# Patient Record
Sex: FEMALE | Race: OTHER | HISPANIC OR LATINO | URBAN - METROPOLITAN AREA
[De-identification: names, ages, dates, MRNs, and addresses within clinical notes are randomized per-mention and may not be internally consistent; named-entity substitution may affect disease eponyms.]

---

## 2017-11-30 ENCOUNTER — EMERGENCY (EMERGENCY)
Facility: HOSPITAL | Age: 37
LOS: 1 days | Discharge: HOME | End: 2017-11-30
Attending: EMERGENCY MEDICINE | Admitting: EMERGENCY MEDICINE
Payer: COMMERCIAL

## 2017-11-30 VITALS
HEART RATE: 110 BPM | SYSTOLIC BLOOD PRESSURE: 140 MMHG | OXYGEN SATURATION: 97 % | DIASTOLIC BLOOD PRESSURE: 80 MMHG | RESPIRATION RATE: 20 BRPM

## 2017-11-30 VITALS
HEART RATE: 99 BPM | SYSTOLIC BLOOD PRESSURE: 126 MMHG | TEMPERATURE: 98 F | RESPIRATION RATE: 18 BRPM | OXYGEN SATURATION: 99 % | DIASTOLIC BLOOD PRESSURE: 85 MMHG

## 2017-11-30 PROCEDURE — 99284 EMERGENCY DEPT VISIT MOD MDM: CPT

## 2017-11-30 PROCEDURE — 72125 CT NECK SPINE W/O DYE: CPT | Mod: 26

## 2017-11-30 PROCEDURE — 99284 EMERGENCY DEPT VISIT MOD MDM: CPT | Mod: 25

## 2017-11-30 PROCEDURE — 72125 CT NECK SPINE W/O DYE: CPT

## 2017-11-30 PROCEDURE — 73060 X-RAY EXAM OF HUMERUS: CPT

## 2017-11-30 PROCEDURE — 73060 X-RAY EXAM OF HUMERUS: CPT | Mod: 26,LT
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
## 2017-11-30 NOTE — ED PROVIDER NOTE - PROGRESS NOTE DETAILS
CT negative. No c-spine tenderness currently good ROM. C-collar cleared. Patient reports improvement in symptoms. Agrees with discharge and outpatient follow up with strict return precautions..
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
## 2021-02-19 ENCOUNTER — ASOB RESULT (OUTPATIENT)
Age: 41
End: 2021-02-19

## 2021-02-19 ENCOUNTER — TRANSCRIPTION ENCOUNTER (OUTPATIENT)
Age: 41
End: 2021-02-19

## 2021-02-19 ENCOUNTER — APPOINTMENT (OUTPATIENT)
Dept: ANTEPARTUM | Facility: CLINIC | Age: 41
End: 2021-02-19
Payer: COMMERCIAL

## 2021-02-19 PROCEDURE — 76801 OB US < 14 WKS SINGLE FETUS: CPT

## 2021-02-19 PROCEDURE — 76813 OB US NUCHAL MEAS 1 GEST: CPT

## 2021-02-19 PROCEDURE — 36416 COLLJ CAPILLARY BLOOD SPEC: CPT

## 2021-02-19 PROCEDURE — 99072 ADDL SUPL MATRL&STAF TM PHE: CPT

## 2021-04-10 ENCOUNTER — ASOB RESULT (OUTPATIENT)
Age: 41
End: 2021-04-10

## 2021-04-10 ENCOUNTER — APPOINTMENT (OUTPATIENT)
Dept: ANTEPARTUM | Facility: CLINIC | Age: 41
End: 2021-04-10
Payer: COMMERCIAL

## 2021-04-10 PROCEDURE — 99072 ADDL SUPL MATRL&STAF TM PHE: CPT

## 2021-04-10 PROCEDURE — 76811 OB US DETAILED SNGL FETUS: CPT

## 2021-04-20 ENCOUNTER — APPOINTMENT (OUTPATIENT)
Dept: ANTEPARTUM | Facility: CLINIC | Age: 41
End: 2021-04-20
Payer: COMMERCIAL

## 2021-04-20 ENCOUNTER — ASOB RESULT (OUTPATIENT)
Age: 41
End: 2021-04-20

## 2021-04-20 PROCEDURE — 99072 ADDL SUPL MATRL&STAF TM PHE: CPT

## 2021-04-20 PROCEDURE — 99213 OFFICE O/P EST LOW 20 MIN: CPT | Mod: 25

## 2021-04-20 PROCEDURE — 76816 OB US FOLLOW-UP PER FETUS: CPT

## 2021-07-01 ENCOUNTER — INPATIENT (INPATIENT)
Facility: HOSPITAL | Age: 41
LOS: 14 days | Discharge: HOME | End: 2021-07-16
Attending: OBSTETRICS & GYNECOLOGY | Admitting: OBSTETRICS & GYNECOLOGY
Payer: COMMERCIAL

## 2021-07-01 VITALS
DIASTOLIC BLOOD PRESSURE: 69 MMHG | RESPIRATION RATE: 16 BRPM | SYSTOLIC BLOOD PRESSURE: 126 MMHG | TEMPERATURE: 99 F | HEART RATE: 106 BPM

## 2021-07-01 DIAGNOSIS — Z3A.00 WEEKS OF GESTATION OF PREGNANCY NOT SPECIFIED: ICD-10-CM

## 2021-07-01 DIAGNOSIS — O42.90 PREMATURE RUPTURE OF MEMBRANES, UNSPECIFIED AS TO LENGTH OF TIME BETWEEN RUPTURE AND ONSET OF LABOR, UNSPECIFIED WEEKS OF GESTATION: ICD-10-CM

## 2021-07-01 DIAGNOSIS — Z98.891 HISTORY OF UTERINE SCAR FROM PREVIOUS SURGERY: Chronic | ICD-10-CM

## 2021-07-01 DIAGNOSIS — O26.899 OTHER SPECIFIED PREGNANCY RELATED CONDITIONS, UNSPECIFIED TRIMESTER: ICD-10-CM

## 2021-07-01 LAB
APPEARANCE UR: CLEAR — SIGNIFICANT CHANGE UP
BASOPHILS # BLD AUTO: 0.04 K/UL — SIGNIFICANT CHANGE UP (ref 0–0.2)
BASOPHILS NFR BLD AUTO: 0.3 % — SIGNIFICANT CHANGE UP (ref 0–2)
BILIRUB UR-MCNC: NEGATIVE — SIGNIFICANT CHANGE UP
BLD GP AB SCN SERPL QL: NEGATIVE — SIGNIFICANT CHANGE UP
COLOR SPEC: SIGNIFICANT CHANGE UP
DIFF PNL FLD: NEGATIVE — SIGNIFICANT CHANGE UP
EOSINOPHIL # BLD AUTO: 0.16 K/UL — SIGNIFICANT CHANGE UP (ref 0–0.5)
EOSINOPHIL NFR BLD AUTO: 1.2 % — SIGNIFICANT CHANGE UP (ref 0–6)
GLUCOSE UR QL: NEGATIVE — SIGNIFICANT CHANGE UP
HCT VFR BLD CALC: 37.9 % — SIGNIFICANT CHANGE UP (ref 34.5–45)
HGB BLD-MCNC: 11.9 G/DL — SIGNIFICANT CHANGE UP (ref 11.5–15.5)
IANC: 9.74 K/UL — HIGH (ref 1.5–8.5)
IMM GRANULOCYTES NFR BLD AUTO: 1.4 % — SIGNIFICANT CHANGE UP (ref 0–1.5)
KETONES UR-MCNC: NEGATIVE — SIGNIFICANT CHANGE UP
LEUKOCYTE ESTERASE UR-ACNC: NEGATIVE — SIGNIFICANT CHANGE UP
LYMPHOCYTES # BLD AUTO: 18 % — SIGNIFICANT CHANGE UP (ref 13–44)
LYMPHOCYTES # BLD AUTO: 2.43 K/UL — SIGNIFICANT CHANGE UP (ref 1–3.3)
MCHC RBC-ENTMCNC: 28.9 PG — SIGNIFICANT CHANGE UP (ref 27–34)
MCHC RBC-ENTMCNC: 31.4 GM/DL — LOW (ref 32–36)
MCV RBC AUTO: 92 FL — SIGNIFICANT CHANGE UP (ref 80–100)
MONOCYTES # BLD AUTO: 0.97 K/UL — HIGH (ref 0–0.9)
MONOCYTES NFR BLD AUTO: 7.2 % — SIGNIFICANT CHANGE UP (ref 2–14)
NEUTROPHILS # BLD AUTO: 9.74 K/UL — HIGH (ref 1.8–7.4)
NEUTROPHILS NFR BLD AUTO: 71.9 % — SIGNIFICANT CHANGE UP (ref 43–77)
NITRITE UR-MCNC: NEGATIVE — SIGNIFICANT CHANGE UP
NRBC # BLD: 0 /100 WBCS — SIGNIFICANT CHANGE UP
NRBC # FLD: 0 K/UL — SIGNIFICANT CHANGE UP
PH UR: 7 — SIGNIFICANT CHANGE UP (ref 5–8)
PLATELET # BLD AUTO: 243 K/UL — SIGNIFICANT CHANGE UP (ref 150–400)
PROT UR-MCNC: NEGATIVE — SIGNIFICANT CHANGE UP
RBC # BLD: 4.12 M/UL — SIGNIFICANT CHANGE UP (ref 3.8–5.2)
RBC # FLD: 13.9 % — SIGNIFICANT CHANGE UP (ref 10.3–14.5)
RH IG SCN BLD-IMP: POSITIVE — SIGNIFICANT CHANGE UP
RH IG SCN BLD-IMP: POSITIVE — SIGNIFICANT CHANGE UP
SP GR SPEC: 1.01 — SIGNIFICANT CHANGE UP (ref 1.01–1.02)
UROBILINOGEN FLD QL: SIGNIFICANT CHANGE UP
WBC # BLD: 13.53 K/UL — HIGH (ref 3.8–10.5)
WBC # FLD AUTO: 13.53 K/UL — HIGH (ref 3.8–10.5)

## 2021-07-01 RX ORDER — AMPICILLIN TRIHYDRATE 250 MG
2 CAPSULE ORAL EVERY 6 HOURS
Refills: 0 | Status: DISCONTINUED | OUTPATIENT
Start: 2021-07-02 | End: 2021-07-02

## 2021-07-01 RX ORDER — AMOXICILLIN 250 MG/5ML
500 SUSPENSION, RECONSTITUTED, ORAL (ML) ORAL EVERY 8 HOURS
Refills: 0 | Status: DISCONTINUED | OUTPATIENT
Start: 2021-07-01 | End: 2021-07-02

## 2021-07-01 RX ORDER — SODIUM CHLORIDE 9 MG/ML
1000 INJECTION, SOLUTION INTRAVENOUS
Refills: 0 | Status: DISCONTINUED | OUTPATIENT
Start: 2021-07-01 | End: 2021-07-01

## 2021-07-01 RX ORDER — AMPICILLIN TRIHYDRATE 250 MG
2 CAPSULE ORAL ONCE
Refills: 0 | Status: COMPLETED | OUTPATIENT
Start: 2021-07-01 | End: 2021-07-01

## 2021-07-01 RX ORDER — AMPICILLIN TRIHYDRATE 250 MG
CAPSULE ORAL
Refills: 0 | Status: DISCONTINUED | OUTPATIENT
Start: 2021-07-01 | End: 2021-07-02

## 2021-07-01 RX ORDER — AZITHROMYCIN 500 MG/1
1000 TABLET, FILM COATED ORAL ONCE
Refills: 0 | Status: COMPLETED | OUTPATIENT
Start: 2021-07-01 | End: 2021-07-01

## 2021-07-01 RX ADMIN — Medication 12 MILLIGRAM(S): at 19:31

## 2021-07-01 RX ADMIN — AZITHROMYCIN 1000 MILLIGRAM(S): 500 TABLET, FILM COATED ORAL at 19:31

## 2021-07-01 RX ADMIN — Medication 116 GRAM(S): at 19:09

## 2021-07-01 NOTE — OB PROVIDER H&P - ATTENDING COMMENTS
Admit for PPROM  Patient is for latency ABx and BMZ  Plan for delivery at 34 wks   Patient has history of  section and is for repeat  section and bilateral tubal ligation

## 2021-07-01 NOTE — OB PROVIDER H&P - TERM DELIVERIES, OB PROFILE
MA and MDI TX given.  Pt. Tolerated tx well, MARGARET.  Pt. States that she would like to be placed on BIPAP tonight between  9:30pm and 10:00pm.     1

## 2021-07-01 NOTE — OB RN PATIENT PROFILE - NS_DATEOFLASTVISIT_OBGYN_ALL_OB_DT
Pt active and playing in triage waiting room. No outward s/sx of discomfort or distress noted.   02-Jun-2021

## 2021-07-01 NOTE — OB PROVIDER TRIAGE NOTE - HISTORY OF PRESENT ILLNESS
40yo  female  @ 32.2 wks SLIUP uncomp PNC here complaining of leaking clear fluid since 10pm last night. Pt reports GFM and denies VB/ctx's.    Pmhx-denies  Pshx/ltrr-u-ffuajsl  Meds-PNV  NKDA  Past [id number] for FTD  Gyn-uterine polyp  Soc-denies

## 2021-07-01 NOTE — OB PROVIDER H&P - ASSESSMENT
40yo  female  @ 32.2 wks SLIUP uncomp PNC here with confirmed rupture of clear membranes since 10 pm last night  -SSE os appears 1 cm; no ctx's on toco and pt denies feeling any ctx's  -GBS collected and sent with UA and Ucx  -pt was admitted for PPROM and was dc Dr Wolff  -pt for Abx's and betamethasone  -pt was swabbed for covid-9  -support person presents covid vaccination

## 2021-07-01 NOTE — OB PROVIDER TRIAGE NOTE - NSHPPHYSICALEXAM_GEN_ALL_CORE
Gen: A&O x 3; NAD  Vitals; BP-133/74; P-102; T-37.2    Pulm-CTA B/L; no wheezes  Cor-clear S1S2; no murmurs  Abd exam-soft and nontender    SSE-+pooling of clear fluid; +nitrazine; +ferning. Os appears to be FT dilated    TAS-psoterior placenta; vtx; 4#14 (2211g); 8/8 BPP    NST cat I with 150 basline with accels and mod variability; no ctx's

## 2021-07-01 NOTE — OB PROVIDER H&P - HISTORY OF PRESENT ILLNESS
42yo  female  @ 32.2 wks SLIUP uncomp PNC here complaining of leaking clear fluid since 10pm last night. Pt reports GFM and denies VB/ctx's.    Pmhx-denies  Pshx/klpe-t-rehsuxw  Meds-PNV  NKDA  Past ob-[id number] for FTD  Gyn-uterine polyp  Soc-denies 42yo  female  @ 32.2 wks SLIUP uncomp PNC here complaining of leaking clear fluid since 10pm last night. Pt reports GFM and denies VB/ctx's.    Pmhx-denies  Pshx/hyls-d-mqzfwfv  Meds-PNV  NKDA  Past ob-[id number] for FTD  Gyn-uterine polyp  Soc-denies

## 2021-07-01 NOTE — OB RN PATIENT PROFILE - CURRENT PREGNANCY COMPLICATIONS, OB PROFILE
None  Premature Rupture of Membranes (PPROM)/Gestational Age less than 36 Weeks/Maternal Unknown GBS

## 2021-07-02 DIAGNOSIS — O42.10 PREMATURE RUPTURE OF MEMBRANES, ONSET OF LABOR MORE THAN 24 HOURS FOLLOWING RUPTURE, UNSPECIFIED WEEKS OF GESTATION: ICD-10-CM

## 2021-07-02 LAB
BASOPHILS # BLD AUTO: 0.03 K/UL — SIGNIFICANT CHANGE UP (ref 0–0.2)
BASOPHILS # BLD AUTO: 0.03 K/UL — SIGNIFICANT CHANGE UP (ref 0–0.2)
BASOPHILS NFR BLD AUTO: 0.2 % — SIGNIFICANT CHANGE UP (ref 0–2)
BASOPHILS NFR BLD AUTO: 0.2 % — SIGNIFICANT CHANGE UP (ref 0–2)
COVID-19 SPIKE DOMAIN AB INTERP: NEGATIVE — SIGNIFICANT CHANGE UP
COVID-19 SPIKE DOMAIN ANTIBODY RESULT: 0.4 U/ML — SIGNIFICANT CHANGE UP
EOSINOPHIL # BLD AUTO: 0 K/UL — SIGNIFICANT CHANGE UP (ref 0–0.5)
EOSINOPHIL # BLD AUTO: 0.02 K/UL — SIGNIFICANT CHANGE UP (ref 0–0.5)
EOSINOPHIL NFR BLD AUTO: 0 % — SIGNIFICANT CHANGE UP (ref 0–6)
EOSINOPHIL NFR BLD AUTO: 0.1 % — SIGNIFICANT CHANGE UP (ref 0–6)
HCT VFR BLD CALC: 33.4 % — LOW (ref 34.5–45)
HCT VFR BLD CALC: 35 % — SIGNIFICANT CHANGE UP (ref 34.5–45)
HGB BLD-MCNC: 10.8 G/DL — LOW (ref 11.5–15.5)
HGB BLD-MCNC: 11.4 G/DL — LOW (ref 11.5–15.5)
IANC: 13.68 K/UL — HIGH (ref 1.5–8.5)
IANC: 15.21 K/UL — HIGH (ref 1.5–8.5)
IMM GRANULOCYTES NFR BLD AUTO: 1.3 % — SIGNIFICANT CHANGE UP (ref 0–1.5)
IMM GRANULOCYTES NFR BLD AUTO: 1.8 % — HIGH (ref 0–1.5)
LYMPHOCYTES # BLD AUTO: 1.56 K/UL — SIGNIFICANT CHANGE UP (ref 1–3.3)
LYMPHOCYTES # BLD AUTO: 11.7 % — LOW (ref 13–44)
LYMPHOCYTES # BLD AUTO: 2.03 K/UL — SIGNIFICANT CHANGE UP (ref 1–3.3)
LYMPHOCYTES # BLD AUTO: 8.9 % — LOW (ref 13–44)
MCHC RBC-ENTMCNC: 28.9 PG — SIGNIFICANT CHANGE UP (ref 27–34)
MCHC RBC-ENTMCNC: 29.4 PG — SIGNIFICANT CHANGE UP (ref 27–34)
MCHC RBC-ENTMCNC: 32.3 GM/DL — SIGNIFICANT CHANGE UP (ref 32–36)
MCHC RBC-ENTMCNC: 32.6 GM/DL — SIGNIFICANT CHANGE UP (ref 32–36)
MCV RBC AUTO: 88.6 FL — SIGNIFICANT CHANGE UP (ref 80–100)
MCV RBC AUTO: 91 FL — SIGNIFICANT CHANGE UP (ref 80–100)
MONOCYTES # BLD AUTO: 0.38 K/UL — SIGNIFICANT CHANGE UP (ref 0–0.9)
MONOCYTES # BLD AUTO: 1.34 K/UL — HIGH (ref 0–0.9)
MONOCYTES NFR BLD AUTO: 2.2 % — SIGNIFICANT CHANGE UP (ref 2–14)
MONOCYTES NFR BLD AUTO: 7.7 % — SIGNIFICANT CHANGE UP (ref 2–14)
NEUTROPHILS # BLD AUTO: 13.68 K/UL — HIGH (ref 1.8–7.4)
NEUTROPHILS # BLD AUTO: 15.21 K/UL — HIGH (ref 1.8–7.4)
NEUTROPHILS NFR BLD AUTO: 79 % — HIGH (ref 43–77)
NEUTROPHILS NFR BLD AUTO: 86.9 % — HIGH (ref 43–77)
NRBC # BLD: 0 /100 WBCS — SIGNIFICANT CHANGE UP
NRBC # BLD: 0 /100 WBCS — SIGNIFICANT CHANGE UP
NRBC # FLD: 0 K/UL — SIGNIFICANT CHANGE UP
NRBC # FLD: 0 K/UL — SIGNIFICANT CHANGE UP
PLATELET # BLD AUTO: 233 K/UL — SIGNIFICANT CHANGE UP (ref 150–400)
PLATELET # BLD AUTO: 236 K/UL — SIGNIFICANT CHANGE UP (ref 150–400)
RBC # BLD: 3.67 M/UL — LOW (ref 3.8–5.2)
RBC # BLD: 3.95 M/UL — SIGNIFICANT CHANGE UP (ref 3.8–5.2)
RBC # FLD: 13.8 % — SIGNIFICANT CHANGE UP (ref 10.3–14.5)
RBC # FLD: 14 % — SIGNIFICANT CHANGE UP (ref 10.3–14.5)
SARS-COV-2 IGG+IGM SERPL QL IA: 0.4 U/ML — SIGNIFICANT CHANGE UP
SARS-COV-2 IGG+IGM SERPL QL IA: NEGATIVE — SIGNIFICANT CHANGE UP
SARS-COV-2 RNA SPEC QL NAA+PROBE: SIGNIFICANT CHANGE UP
T PALLIDUM AB TITR SER: NEGATIVE — SIGNIFICANT CHANGE UP
WBC # BLD: 17.33 K/UL — HIGH (ref 3.8–10.5)
WBC # BLD: 17.5 K/UL — HIGH (ref 3.8–10.5)
WBC # FLD AUTO: 17.33 K/UL — HIGH (ref 3.8–10.5)
WBC # FLD AUTO: 17.5 K/UL — HIGH (ref 3.8–10.5)

## 2021-07-02 PROCEDURE — 93010 ELECTROCARDIOGRAM REPORT: CPT

## 2021-07-02 RX ORDER — AMPICILLIN TRIHYDRATE 250 MG
2 CAPSULE ORAL EVERY 6 HOURS
Refills: 0 | Status: DISCONTINUED | OUTPATIENT
Start: 2021-07-02 | End: 2021-07-02

## 2021-07-02 RX ORDER — AMOXICILLIN 250 MG/5ML
500 SUSPENSION, RECONSTITUTED, ORAL (ML) ORAL EVERY 8 HOURS
Refills: 0 | Status: DISCONTINUED | OUTPATIENT
Start: 2021-07-02 | End: 2021-07-02

## 2021-07-02 RX ORDER — SODIUM CHLORIDE 9 MG/ML
1000 INJECTION, SOLUTION INTRAVENOUS
Refills: 0 | Status: DISCONTINUED | OUTPATIENT
Start: 2021-07-02 | End: 2021-07-02

## 2021-07-02 RX ORDER — SODIUM CHLORIDE 9 MG/ML
500 INJECTION, SOLUTION INTRAVENOUS ONCE
Refills: 0 | Status: COMPLETED | OUTPATIENT
Start: 2021-07-02 | End: 2021-07-02

## 2021-07-02 RX ORDER — AMPICILLIN TRIHYDRATE 250 MG
2 CAPSULE ORAL EVERY 6 HOURS
Refills: 0 | Status: DISCONTINUED | OUTPATIENT
Start: 2021-07-02 | End: 2021-07-03

## 2021-07-02 RX ORDER — AMOXICILLIN 250 MG/5ML
500 SUSPENSION, RECONSTITUTED, ORAL (ML) ORAL EVERY 8 HOURS
Refills: 0 | Status: COMPLETED | OUTPATIENT
Start: 2021-07-02

## 2021-07-02 RX ADMIN — Medication 116 GRAM(S): at 13:53

## 2021-07-02 RX ADMIN — Medication 116 GRAM(S): at 01:32

## 2021-07-02 RX ADMIN — Medication 116 GRAM(S): at 20:15

## 2021-07-02 RX ADMIN — SODIUM CHLORIDE 1000 MILLILITER(S): 9 INJECTION, SOLUTION INTRAVENOUS at 11:57

## 2021-07-02 RX ADMIN — Medication 116 GRAM(S): at 08:27

## 2021-07-02 RX ADMIN — Medication 12 MILLIGRAM(S): at 18:00

## 2021-07-02 NOTE — PROGRESS NOTE ADULT - ASSESSMENT
AP 41y yo  at 32w3d admitted with PPROM at 32w2d.  Patient is currently stable without signs of labor or infection.    1. PPROM  -c/w latency antibiotics: s/p Azithro (), Amp (-), for amox following 48h amp  -betamethasone for fetal lung maturity, #2 @ 730p  -continue to monitor fetal status and for signs of labor or infection    2.  Maternal well-being  -Reg diet  -HSQ, SCDs, and ambulation for DVT prophylaxis  -NS@125    3.  Fetal well being   -NST BID 2hr  -ATU sono twice weekly  -prenatal vitamin    4.  Delivery  -delivery at 34 w for rLTCS or earlier if fetal or maternal status deteriorates    ADomney PGY-3

## 2021-07-02 NOTE — PROVIDER CONTACT NOTE (OTHER) - ASSESSMENT
, with repeat of 120-122.  B/P 116/60, O2 sat 99%. resp 17.  denies any abd tenderness or cramping.  feeling good fetal movement.  FH

## 2021-07-02 NOTE — PROGRESS NOTE ADULT - SUBJECTIVE AND OBJECTIVE BOX
R3 Antepartum Note, HD#2    Patient seen and examined at bedside, no acute overnight events. No acute complaints. Pt reports +FM, denies LOF, VB, ctx, HA, epigastric pain, blurred vision, CP, SOB, N/V, fevers, and chills.    Vital Signs Last 24 Hours  T(C): 37.1 (07-02-21 @ 09:35), Max: 37.2 (07-01-21 @ 16:52)  HR: 122 (07-02-21 @ 09:35) (90 - 125)  BP: 116/60 (07-02-21 @ 09:35) (93/57 - 133/74)  RR: 17 (07-02-21 @ 09:35) (16 - 17)  SpO2: 99% (07-02-21 @ 09:35) (92% - 100%)      Physical Exam:  General: NAD  Abdomen: Soft, non-tender, gravid  Ext: No pain or swelling    NST reactive overnight        Labs:             11.4   17.50 )-----------( 236      ( 07-02 @ 10:26 )             35.0             amoxicillin 500 milliGRAM(s) Oral every 8 hours  ampicillin  IVPB 2 Gram(s) IV Intermittent every 6 hours  betamethasone Injectable 12 milliGRAM(s) IntraMuscular daily  heparin   Injectable 5000 Unit(s) SubCutaneous every 12 hours  lactated ringers Bolus 500 milliLiter(s) IV Bolus once  oxytocin Infusion 333.333 milliUNIT(s)/Min (1000 mL/Hr) IV Continuous <Continuous>    MEDICATIONS  (PRN):

## 2021-07-03 LAB
CULTURE RESULTS: NO GROWTH — SIGNIFICANT CHANGE UP
SPECIMEN SOURCE: SIGNIFICANT CHANGE UP

## 2021-07-03 RX ORDER — AMPICILLIN TRIHYDRATE 250 MG
2 CAPSULE ORAL EVERY 6 HOURS
Refills: 0 | Status: COMPLETED | OUTPATIENT
Start: 2021-07-03 | End: 2021-07-03

## 2021-07-03 RX ORDER — AMOXICILLIN 250 MG/5ML
500 SUSPENSION, RECONSTITUTED, ORAL (ML) ORAL EVERY 8 HOURS
Refills: 0 | Status: COMPLETED | OUTPATIENT
Start: 2021-07-03 | End: 2021-07-08

## 2021-07-03 RX ADMIN — Medication 116 GRAM(S): at 02:55

## 2021-07-03 RX ADMIN — Medication 216 GRAM(S): at 08:09

## 2021-07-03 RX ADMIN — Medication 216 GRAM(S): at 14:05

## 2021-07-03 RX ADMIN — Medication 500 MILLIGRAM(S): at 22:15

## 2021-07-03 NOTE — PROGRESS NOTE ADULT - ASSESSMENT
40yo  at 32w4d a/w PPROM at 32w2d. Patient is currently stable without signs of labor or infection.    1. PPROM  -c/w latency antibiotics: s/p Azithro (), Amp 6/8 doses completed (-), for amox following 48h amp (8 doses)  -s/p BMZ for fetal lung maturity (-2)  -continue to monitor fetal status and for signs of labor or infection    2.  Maternal well-being  -Reg diet  -HSQ, SCDs, and ambulation for DVT prophylaxis  -SLIV    3.  Fetal well being   -NST BID 2hr  -ATU sono twice weekly  -prenatal vitamin    4.  Delivery  -delivery at 34 w for rLTCS or earlier if fetal or maternal status deteriorates    Joanna Kathy R3   42yo  at 32w4d a/w PPROM at 32w2d. Patient is currently stable without signs of labor or infection.    1. PPROM  -c/w latency antibiotics: s/p Azithro (), Amp 6/8 doses completed (-), for amox following 48h amp (8 doses)  -s/p BMZ for fetal lung maturity (-2)  -continue to monitor fetal status and for signs of labor or infection    2.  Maternal well-being  -Reg diet  -HSQ, SCDs, and ambulation for DVT prophylaxis  -SLIV    3.  Fetal well being   -NST BID 2hr, start now 2/2 "slower" FM  -ATU sono twice weekly  -prenatal vitamin    4.  Delivery  -delivery at 34 w for rLTCS or earlier if fetal or maternal status deteriorates    Joanna Kathy R3

## 2021-07-03 NOTE — PROGRESS NOTE ADULT - SUBJECTIVE AND OBJECTIVE BOX
INTERVAL HPI/OVERNIGHT EVENTS: Pt seenat bedside.  Doing well, denies vaginal bleeding, contractions, foul smelling discharge, or decreased fetal movement     MEDICATIONS  (STANDING):  amoxicillin 500 milliGRAM(s) Oral every 8 hours  ampicillin  IVPB 2 Gram(s) IV Intermittent every 6 hours  heparin   Injectable 5000 Unit(s) SubCutaneous every 12 hours  oxytocin Infusion 333.333 milliUNIT(s)/Min (1000 mL/Hr) IV Continuous <Continuous>    MEDICATIONS  (PRN):      Vital Signs Last 24 Hrs  T(C): 36.7 (2021 05:24), Max: 37.2 (2021 22:28)  T(F): 98 (2021 05:24), Max: 98.9 (2021 22:28)  HR: 94 (2021 05:25) (88 - 122)  BP: 86/49 (2021 05:25) (86/49 - 116/60)  BP(mean): --  RR: 17 (2021 05:24) (16 - 17)  SpO2: 96% (2021 05:24) (96% - 100%)    PHYSICAL EXAM:    GA: NAD, A+0 x 3  Abd: ( + ) BS, soft, nontender, nondistended, no rebound or guarding,   Uterus: Fundus midline; firm    LABS:                        10.8   17.33 )-----------( 233      ( 2021 17:37 )             33.4             Urinalysis Basic - ( 2021 20:37 )    Color: Light Yellow / Appearance: Clear / S.014 / pH: x  Gluc: x / Ketone: Negative  / Bili: Negative / Urobili: <2 mg/dL   Blood: x / Protein: Negative / Nitrite: Negative   Leuk Esterase: Negative / RBC: x / WBC x   Sq Epi: x / Non Sq Epi: x / Bacteria: x          RADIOLOGY & ADDITIONAL TESTS:

## 2021-07-03 NOTE — PROGRESS NOTE ADULT - ASSESSMENT
P1 @ 33 4/7 admitted for pprom, elevated white count but no other signs of infection   1. PPROM  -c/w latency antibiotics: s/p Azithro (7/1), Amp (7/1-), for amox following 48h amp  -betamethasone for fetal lung maturity, #2 @ 730p  -continue to monitor fetal status and for signs of labor or infection    2.  Maternal well-being  -Reg diet  -HSQ, SCDs, and ambulation for DVT prophylaxis  -NS@125    3.  Fetal well being   -NST BID 2hr  -ATU sono twice weekly  -prenatal vitamin    4.  Delivery  -delivery at 34 w for rLTCS or earlier if fetal or maternal status deteriorates

## 2021-07-03 NOTE — PROGRESS NOTE ADULT - SUBJECTIVE AND OBJECTIVE BOX
R3 OB ANTEPARTUM NOTE    Patient seen and examined at bedside, no acute overnight events.   No acute complaints. Pt reports slower fetal movement than typical at this hour.  Continued LOF, changes pads 3x/day 2/2 hygiene and saturation.   Denies CTX, VB. Denies CP, SOB, N/V, fevers, and chills.    Vital Signs Last 24 Hours  T(C): 36.7 (07-03-21 @ 05:24), Max: 37.2 (07-02-21 @ 22:28)  HR: 94 (07-03-21 @ 05:25) (88 - 122)  BP: 86/49 (07-03-21 @ 05:25) (86/49 - 116/60)  RR: 17 (07-03-21 @ 05:24) (16 - 17)  SpO2: 96% (07-03-21 @ 05:24) (96% - 99%)      Physical Exam:  General: NAD  Chest: nonlabored breathing  Abdomen: Soft, gravid, nontender fundus  : clean perineal pad  Ext: No pain or swelling    Labs:             10.8   17.33 )-----------( 233      ( 07-02 @ 17:37 )             33.4       MEDICATIONS  (STANDING):  amoxicillin 500 milliGRAM(s) Oral every 8 hours  ampicillin  IVPB 2 Gram(s) IV Intermittent every 6 hours  heparin   Injectable 5000 Unit(s) SubCutaneous every 12 hours  oxytocin Infusion 333.333 milliUNIT(s)/Min (1000 mL/Hr) IV Continuous <Continuous>    MEDICATIONS  (PRN):

## 2021-07-04 LAB
BLD GP AB SCN SERPL QL: NEGATIVE — SIGNIFICANT CHANGE UP
CULTURE RESULTS: SIGNIFICANT CHANGE UP
RH IG SCN BLD-IMP: POSITIVE — SIGNIFICANT CHANGE UP
SPECIMEN SOURCE: SIGNIFICANT CHANGE UP

## 2021-07-04 RX ADMIN — Medication 500 MILLIGRAM(S): at 06:00

## 2021-07-04 RX ADMIN — Medication 500 MILLIGRAM(S): at 22:11

## 2021-07-04 RX ADMIN — Medication 500 MILLIGRAM(S): at 13:28

## 2021-07-04 NOTE — PROGRESS NOTE ADULT - ASSESSMENT
40yo  at 32w5d a/w PPROM at 32w2d. Patient is currently stable without signs of labor or infection.    1. PPROM  -c/w latency antibiotics: Amoxicillin(7/3-), s/p Azithro (), Amp (-7/3)  -s/p BMZ for fetal lung maturity (-)  -continue to monitor fetal status and for signs of labor or infection    2.  Maternal well-being  -Reg diet  -HSQ, SCDs, and ambulation for DVT prophylaxis  -SLIV    3.  Fetal well being   -NST BID 2hr  -ATU sono twice weekly  -prenatal vitamin    4.  Delivery  -delivery at 34 w for rLTCS or earlier if fetal or maternal status deteriorates    Scotty PGY3

## 2021-07-04 NOTE — PROGRESS NOTE ADULT - SUBJECTIVE AND OBJECTIVE BOX
R3 Antepartum Note, HD#4    Gestational AGE: 32+5    Interval events: Patient seen and examined at bedside, no acute overnight events. No acute complaints. Pt reports +FM, denies LOF, VB, ctx, HA, epigastric pain, blurred vision, CP, SOB, N/V, fevers, and chills.    Vital Signs Last 24 Hours  T(C): 37 (07-04-21 @ 01:18), Max: 37.2 (07-03-21 @ 22:42)  HR: 86 (07-04-21 @ 01:18) (85 - 107)  BP: 104/57 (07-04-21 @ 01:18) (86/49 - 120/58)  RR: 17 (07-04-21 @ 01:18) (16 - 18)  SpO2: 98% (07-04-21 @ 01:18) (96% - 99%)    CAPILLARY BLOOD GLUCOSE          Physical Exam:  General: NAD  Abdomen: Soft, non-tender, gravid  Ext: No pain or swelling    NST reactive overnight    Labs:             10.8   17.33 )-----------( 233      ( 07-02 @ 17:37 )             33.4                   MEDICATIONS  (STANDING):  amoxicillin 500 milliGRAM(s) Oral every 8 hours  heparin   Injectable 5000 Unit(s) SubCutaneous every 12 hours  oxytocin Infusion 333.333 milliUNIT(s)/Min (1000 mL/Hr) IV Continuous <Continuous>    MEDICATIONS  (PRN):

## 2021-07-05 LAB
BASOPHILS # BLD AUTO: 0.05 K/UL — SIGNIFICANT CHANGE UP (ref 0–0.2)
BASOPHILS NFR BLD AUTO: 0.4 % — SIGNIFICANT CHANGE UP (ref 0–2)
EOSINOPHIL # BLD AUTO: 0.15 K/UL — SIGNIFICANT CHANGE UP (ref 0–0.5)
EOSINOPHIL NFR BLD AUTO: 1.1 % — SIGNIFICANT CHANGE UP (ref 0–6)
HCT VFR BLD CALC: 34.5 % — SIGNIFICANT CHANGE UP (ref 34.5–45)
HGB BLD-MCNC: 11.2 G/DL — LOW (ref 11.5–15.5)
IANC: 9.31 K/UL — HIGH (ref 1.5–8.5)
IMM GRANULOCYTES NFR BLD AUTO: 2.2 % — HIGH (ref 0–1.5)
LYMPHOCYTES # BLD AUTO: 18.6 % — SIGNIFICANT CHANGE UP (ref 13–44)
LYMPHOCYTES # BLD AUTO: 2.49 K/UL — SIGNIFICANT CHANGE UP (ref 1–3.3)
MCHC RBC-ENTMCNC: 29.7 PG — SIGNIFICANT CHANGE UP (ref 27–34)
MCHC RBC-ENTMCNC: 32.5 GM/DL — SIGNIFICANT CHANGE UP (ref 32–36)
MCV RBC AUTO: 91.5 FL — SIGNIFICANT CHANGE UP (ref 80–100)
MONOCYTES # BLD AUTO: 1.1 K/UL — HIGH (ref 0–0.9)
MONOCYTES NFR BLD AUTO: 8.2 % — SIGNIFICANT CHANGE UP (ref 2–14)
NEUTROPHILS # BLD AUTO: 9.31 K/UL — HIGH (ref 1.8–7.4)
NEUTROPHILS NFR BLD AUTO: 69.5 % — SIGNIFICANT CHANGE UP (ref 43–77)
NRBC # BLD: 0 /100 WBCS — SIGNIFICANT CHANGE UP
NRBC # FLD: 0 K/UL — SIGNIFICANT CHANGE UP
PLATELET # BLD AUTO: 226 K/UL — SIGNIFICANT CHANGE UP (ref 150–400)
RBC # BLD: 3.77 M/UL — LOW (ref 3.8–5.2)
RBC # FLD: 14.4 % — SIGNIFICANT CHANGE UP (ref 10.3–14.5)
WBC # BLD: 13.4 K/UL — HIGH (ref 3.8–10.5)
WBC # FLD AUTO: 13.4 K/UL — HIGH (ref 3.8–10.5)

## 2021-07-05 RX ADMIN — Medication 500 MILLIGRAM(S): at 06:07

## 2021-07-05 RX ADMIN — Medication 500 MILLIGRAM(S): at 22:15

## 2021-07-05 RX ADMIN — Medication 500 MILLIGRAM(S): at 13:03

## 2021-07-05 NOTE — PROGRESS NOTE ADULT - ASSESSMENT
40yo  at 32w6d a/w PPROM at 32w2d. Patient is currently stable without signs of labor or infection.    #PPROM  -c/w latency antibiotics: Amox (7/3-), s/p Azithro (), Amp 6/8 doses completed (-3)  -s/p BMZ for fetal lung maturity (-)  -continue to monitor fetal status and for signs of labor or infection    #Maternal well-being  -Reg diet, SLIV  -VTE ppx: HSQ, SCDs, OOB    #Fetal well being   -NST BID 2hr  -ATU sono twice weekly  -prenatal vitamin    #Delivery  -delivery at 34 w for rLTCS or earlier if fetal or maternal status deteriorates    Joanna Kathy R3   40yo  at 32w6d a/w PPROM at 32w2d. Patient is currently stable without signs of labor or infection.    #PPROM  -c/w latency antibiotics: Amox (7/3-), s/p Azithro (), Amp (-3)  -s/p BMZ for fetal lung maturity (-)  -continue to monitor fetal status and for signs of labor or infection    #Maternal well-being  -Reg diet, SLIV  -VTE ppx: HSQ, SCDs, OOB    #Fetal well being   -NST BID 2hr  -ATU sono twice weekly  -prenatal vitamin    #Delivery  -delivery at 34 w for rLTCS or earlier if fetal or maternal status deteriorates    Joanna Kathy R3

## 2021-07-05 NOTE — PROGRESS NOTE ADULT - SUBJECTIVE AND OBJECTIVE BOX
R3 OB ANTEPARTUM NOTE    Patient seen and examined at bedside, no acute overnight events. No acute complaints.   +FM. +LOF. Denies CTX, VB. Denies CP, SOB, N/V, fevers, and chills.    Vital Signs Last 24 Hours  T(C): 36.7 (07-05-21 @ 01:48), Max: 37.2 (07-04-21 @ 22:19)  HR: 83 (07-05-21 @ 01:48) (75 - 97)  BP: 100/55 (07-05-21 @ 01:48) (96/54 - 121/60)  RR: 17 (07-05-21 @ 01:48) (17 - 18)  SpO2: 99% (07-05-21 @ 01:48) (98% - 99%)      Physical Exam:  General: NAD  Chest: nonlabored breathing  Abdomen: Soft, non-tender, gravid  Ext: No pain or swelling    Labs:    MEDICATIONS  (STANDING):  amoxicillin 500 milliGRAM(s) Oral every 8 hours  heparin   Injectable 5000 Unit(s) SubCutaneous every 12 hours  oxytocin Infusion 333.333 milliUNIT(s)/Min (1000 mL/Hr) IV Continuous <Continuous>    MEDICATIONS  (PRN):       R3 OB ANTEPARTUM NOTE    Patient seen and examined at bedside, no acute overnight events. No acute complaints.   +FM. +continued clear LOF. Denies CTX, VB. Denies CP, SOB, N/V, fevers, and chills.    Vital Signs Last 24 Hours  T(C): 36.7 (07-05-21 @ 01:48), Max: 37.2 (07-04-21 @ 22:19)  HR: 83 (07-05-21 @ 01:48) (75 - 97)  BP: 100/55 (07-05-21 @ 01:48) (96/54 - 121/60)  RR: 17 (07-05-21 @ 01:48) (17 - 18)  SpO2: 99% (07-05-21 @ 01:48) (98% - 99%)      Physical Exam:  General: NAD  Chest: nonlabored breathing  Abdomen: Soft, fundus non-tender, gravid  Ext: No pain or swelling    Labs:    MEDICATIONS  (STANDING):  amoxicillin 500 milliGRAM(s) Oral every 8 hours  heparin   Injectable 5000 Unit(s) SubCutaneous every 12 hours  oxytocin Infusion 333.333 milliUNIT(s)/Min (1000 mL/Hr) IV Continuous <Continuous>    MEDICATIONS  (PRN):       R3 OB ANTEPARTUM NOTE    Patient seen and examined at bedside, no acute overnight events. No acute complaints.   +FM. +continued clear LOF. Denies CTX, VB. Denies CP, SOB, N/V, fevers, and chills.    Vital Signs Last 24 Hrs  T(C): 36.7 (05 Jul 2021 01:48), Max: 37.2 (04 Jul 2021 22:19)  T(F): 98.1 (05 Jul 2021 01:48), Max: 98.9 (04 Jul 2021 22:19)  HR: 83 (05 Jul 2021 01:48) (75 - 97)  BP: 100/55 (05 Jul 2021 01:48) (96/54 - 121/60)  RR: 17 (05 Jul 2021 01:48) (17 - 18)  SpO2: 99% (05 Jul 2021 01:48) (98% - 99%)    Physical Exam:  General: NAD  Chest: nonlabored breathing  Abdomen: Soft, fundus non-tender, gravid  Ext: No pain or swelling    Labs:    MEDICATIONS  (STANDING):  amoxicillin 500 milliGRAM(s) Oral every 8 hours  heparin   Injectable 5000 Unit(s) SubCutaneous every 12 hours  oxytocin Infusion 333.333 milliUNIT(s)/Min (1000 mL/Hr) IV Continuous <Continuous>    MEDICATIONS  (PRN):       R3 OB ANTEPARTUM NOTE    Patient seen and examined at bedside, no acute overnight events. No acute complaints. Comfortable.  +FM. +continued clear LOF. Denies CTX, VB. Denies CP, SOB, N/V, fevers, and chills.    Vital Signs Last 24 Hrs  T(C): 36.7 (05 Jul 2021 01:48), Max: 37.2 (04 Jul 2021 22:19)  T(F): 98.1 (05 Jul 2021 01:48), Max: 98.9 (04 Jul 2021 22:19)  HR: 83 (05 Jul 2021 01:48) (75 - 97)  BP: 100/55 (05 Jul 2021 01:48) (96/54 - 121/60)  RR: 17 (05 Jul 2021 01:48) (17 - 18)  SpO2: 99% (05 Jul 2021 01:48) (98% - 99%)    Physical Exam:  General: NAD  Chest: nonlabored breathing  Abdomen: Soft, fundus non-tender, gravid  Ext: No pain or swelling    Labs:    MEDICATIONS  (STANDING):  amoxicillin 500 milliGRAM(s) Oral every 8 hours  heparin   Injectable 5000 Unit(s) SubCutaneous every 12 hours  oxytocin Infusion 333.333 milliUNIT(s)/Min (1000 mL/Hr) IV Continuous <Continuous>    MEDICATIONS  (PRN):

## 2021-07-06 ENCOUNTER — ASOB RESULT (OUTPATIENT)
Age: 41
End: 2021-07-06

## 2021-07-06 ENCOUNTER — APPOINTMENT (OUTPATIENT)
Dept: ANTEPARTUM | Facility: CLINIC | Age: 41
End: 2021-07-06

## 2021-07-06 PROBLEM — Z78.9 OTHER SPECIFIED HEALTH STATUS: Chronic | Status: ACTIVE | Noted: 2021-07-01

## 2021-07-06 PROCEDURE — 76816 OB US FOLLOW-UP PER FETUS: CPT | Mod: 26

## 2021-07-06 PROCEDURE — 76819 FETAL BIOPHYS PROFIL W/O NST: CPT | Mod: 26

## 2021-07-06 RX ADMIN — Medication 500 MILLIGRAM(S): at 22:57

## 2021-07-06 RX ADMIN — Medication 500 MILLIGRAM(S): at 14:22

## 2021-07-06 RX ADMIN — Medication 500 MILLIGRAM(S): at 06:07

## 2021-07-06 NOTE — PROGRESS NOTE ADULT - SUBJECTIVE AND OBJECTIVE BOX
R3 Antepartum Note, HD#6    Patient seen and examined at bedside, no acute overnight events. Small LOF. No acute complaints. Pt reports +FM, denies VB, ctx, HA, epigastric pain, blurred vision, CP, SOB, N/V, fevers, and chills.    Vital Signs Last 24 Hours  T(C): 36.4 (07-06-21 @ 05:52), Max: 37 (07-05-21 @ 14:03)  HR: 80 (07-06-21 @ 05:53) (80 - 96)  BP: 96/53 (07-06-21 @ 05:53) (96/53 - 118/58)  RR: 18 (07-06-21 @ 05:52) (14 - 18)  SpO2: 98% (07-06-21 @ 05:52) (97% - 99%)      MEDICATIONS  (STANDING):  amoxicillin 500 milliGRAM(s) Oral every 8 hours  heparin   Injectable 5000 Unit(s) SubCutaneous every 12 hours  oxytocin Infusion 333.333 milliUNIT(s)/Min (1000 mL/Hr) IV Continuous <Continuous>      Physical Exam:  General: NAD  CV: RRR, no m/r/g  Lungs: CTAB, normal wob  Abdomen: Soft, non-tender, gravid  Ext: No pain or swelling    NST reactive overnight    Labs:             11.2   13.40 )-----------( 226      ( 07-05 @ 06:46 )             34.5

## 2021-07-06 NOTE — PROGRESS NOTE ADULT - ASSESSMENT
42yo  at 32w6d a/w PPROM at 32w2d. Patient is currently stable without signs of labor or infection.    #PPROM  -c/w latency antibiotics: Amox (7/3-), s/p Azithro (), Amp (-3)  -s/p BMZ for fetal lung maturity (-)  -continue to monitor fetal status and for signs of labor or infection    #Maternal well-being  -Reg diet, SLIV  -VTE ppx: HSQ, SCDs, OOB    #Fetal well being   -NST BID 2hr  -ATU sono twice weekly  -prenatal vitamin    #Delivery  -delivery at 34 w for rLTCS or earlier if fetal or maternal status deteriorates    ADomney PGY-3

## 2021-07-07 LAB
BLD GP AB SCN SERPL QL: NEGATIVE — SIGNIFICANT CHANGE UP
RH IG SCN BLD-IMP: POSITIVE — SIGNIFICANT CHANGE UP

## 2021-07-07 RX ADMIN — Medication 500 MILLIGRAM(S): at 16:19

## 2021-07-07 RX ADMIN — Medication 500 MILLIGRAM(S): at 23:06

## 2021-07-07 RX ADMIN — Medication 500 MILLIGRAM(S): at 06:39

## 2021-07-07 NOTE — PROGRESS NOTE ADULT - ASSESSMENT
40yo  at 33w1d a/w PPROM at 32w2d. Patient is currently stable without signs of labor or infection.    #PPROM  -c/w latency antibiotics: Amox (7/3-), s/p Azithro (), Amp (-3)  -s/p BMZ for fetal lung maturity (-)  -continue to monitor fetal status and for signs of labor or infection    #Maternal well-being  -Reg diet, SLIV  -VTE ppx: HSQ, SCDs, OOB    #Fetal well being   -NST BID 2hr  -ATU sono twice weekly  -prenatal vitamin  -ATU (): vertex, posterior, RODNEY 13.69, EFW 2241g (61%) w/ AC >95%    #Delivery  -delivery at 34 w for rLTCS or earlier if fetal or maternal status deteriorates    ADomney PGY-3

## 2021-07-07 NOTE — PROGRESS NOTE ADULT - SUBJECTIVE AND OBJECTIVE BOX
R3 Antepartum Note, HD#7    Patient seen and examined at bedside, no acute overnight events. No acute complaints. Pt reports +FM, denies LOF, VB, ctx, HA, epigastric pain, blurred vision, CP, SOB, N/V, fevers, and chills.    Vital Signs Last 24 Hours  T(C): 36.8 (07-07-21 @ 05:43), Max: 37.2 (07-06-21 @ 09:56)  HR: 89 (07-07-21 @ 05:43) (83 - 96)  BP: 114/60 (07-07-21 @ 05:43) (95/50 - 114/60)  RR: 17 (07-07-21 @ 05:43) (17 - 18)  SpO2: 98% (07-07-21 @ 05:43) (96% - 99%)      Physical Exam:  General: NAD  CV: RRR, no m/r/g  Lungs: CTAB  Abdomen: Soft, non-tender, gravid  Ext: No pain or swelling    NST reactive overnight      MEDICATIONS  (STANDING):  amoxicillin 500 milliGRAM(s) Oral every 8 hours  heparin   Injectable 5000 Unit(s) SubCutaneous every 12 hours  oxytocin Infusion 333.333 milliUNIT(s)/Min (1000 mL/Hr) IV Continuous <Continuous>    MEDICATIONS  (PRN):

## 2021-07-08 RX ADMIN — Medication 500 MILLIGRAM(S): at 06:07

## 2021-07-08 RX ADMIN — Medication 500 MILLIGRAM(S): at 13:25

## 2021-07-08 NOTE — PROGRESS NOTE ADULT - SUBJECTIVE AND OBJECTIVE BOX
Patient seen at bedside. No complaints. Reports active FM. Denies fevers/chills. Denies bleeding    Gen: sitting comfortably in bed, NAD  Abd: soft, nontender, gravid  Pelvic: deferred    Vital Signs Last 24 Hrs  T(C): 36.3 (08 Jul 2021 14:30), Max: 37.1 (08 Jul 2021 01:50)  T(F): 97.4 (08 Jul 2021 14:30), Max: 98.7 (08 Jul 2021 01:50)  HR: 100 (08 Jul 2021 14:30) (88 - 111)  BP: 110/58 (08 Jul 2021 14:30) (100/55 - 121/58)  BP(mean): --  RR: 17 (08 Jul 2021 14:30) (16 - 18)  SpO2: 100% (08 Jul 2021 14:30) (97% - 100%)    A/P: 40yo P1 @ 33+2 weeks admitted with PPROM.  -continue current management  -plan for delivery at 34 weeks for RCS  -Patient now declining tubal ligation  -Office OB booking emailed to scheduled RCS - patient requesting Dr. Norman if possible  -NICU called for consultation today

## 2021-07-09 ENCOUNTER — ASOB RESULT (OUTPATIENT)
Age: 41
End: 2021-07-09

## 2021-07-09 ENCOUNTER — APPOINTMENT (OUTPATIENT)
Dept: ANTEPARTUM | Facility: CLINIC | Age: 41
End: 2021-07-09

## 2021-07-09 PROCEDURE — 76819 FETAL BIOPHYS PROFIL W/O NST: CPT | Mod: 26

## 2021-07-09 PROCEDURE — 76820 UMBILICAL ARTERY ECHO: CPT | Mod: 26

## 2021-07-09 NOTE — PROGRESS NOTE ADULT - SUBJECTIVE AND OBJECTIVE BOX
R3 Antepartum Note, HD#9    Patient seen and examined at bedside, no acute overnight events. No acute complaints. Pt reports +FM, denies VB, ctx, HA, epigastric pain, blurred vision, CP, SOB, N/V, fevers, and chills.    Vital Signs Last 24 Hours  T(C): 37 (07-09-21 @ 05:18), Max: 37.5 (07-08-21 @ 18:07)  HR: 95 (07-09-21 @ 05:20) (91 - 104)  BP: 111/58 (07-09-21 @ 05:20) (96/48 - 116/56)  RR: 17 (07-09-21 @ 05:18) (16 - 17)  SpO2: 98% (07-09-21 @ 05:18) (96% - 100%)    CAPILLARY BLOOD GLUCOSE          Physical Exam:  General: NAD  Abdomen: Soft, non-tender, gravid  Ext: No pain or swelling    NST reactive overnight      MEDICATIONS  (STANDING):  heparin   Injectable 5000 Unit(s) SubCutaneous every 12 hours  oxytocin Infusion 333.333 milliUNIT(s)/Min (1000 mL/Hr) IV Continuous <Continuous>  prenatal multivitamin 1 Tablet(s) Oral daily    MEDICATIONS  (PRN):

## 2021-07-09 NOTE — PROGRESS NOTE ADULT - ASSESSMENT
40yo  at 33w3d a/w PPROM at 32w2d. Patient is currently stable without signs of labor or infection.    #PPROM  -c/w latency antibiotics: Amox (7/3-), s/p Azithro (), Amp (-3)  -s/p BMZ for fetal lung maturity (-)  -continue to monitor fetal status and for signs of labor or infection    #Maternal well-being  -Reg diet, SLIV  -VTE ppx: HSQ, SCDs, OOB    #Fetal well being   -NST BID 2hr  -ATU sono twice weekly  -prenatal vitamin  -ATU (): vertex, posterior, RODNEY 13.69, EFW 2241g (61%) w/ AC >95%  -NICU consult completed     #Delivery  -delivery at 34 w for rLTCS or earlier if fetal or maternal status deteriorates    ADomney PGY-3

## 2021-07-10 NOTE — PROGRESS NOTE ADULT - ASSESSMENT
42yo  at 33w3d a/w PPROM at 32w2d. Patient is currently stable without signs of labor or infection.    #PPROM  -s/p latency antibiotics: Amox (7/3-), s/p Azithro (), Amp (-3)  -s/p BMZ for fetal lung maturity (-)  -continue to monitor fetal status and for signs of labor or infection    #Maternal well-being  -Reg diet, SLIV  -VTE ppx: HSQ, SCDs, OOB    #Fetal well being   -NST BID 2hr  -ATU sono twice weekly  -prenatal vitamin  -ATU (): vertex, posterior, RODNEY 13.69, EFW 2241g (61%) w/ AC >95%  -NICU consult completed     #Delivery  -delivery at 34 w for rLTCS or earlier if fetal or maternal status deteriorates    Joanna Kathy R3

## 2021-07-10 NOTE — PROGRESS NOTE ADULT - SUBJECTIVE AND OBJECTIVE BOX
R3 OB ANTEPARTUM NOTE    Patient seen and examined at bedside, no acute overnight events.   No acute complaints. +FM. Continued LOF, clear. Denies CTX, VB.   Denies HA, epigastric pain, blurred vision, CP, SOB, N/V, fevers, and chills.    Vital Signs Last 24 Hours  T(C): 37 (07-10-21 @ 06:04), Max: 37.2 (07-09-21 @ 10:20)  HR: 102 (07-10-21 @ 06:04) (88 - 113)  BP: 102/51 (07-10-21 @ 06:04) (100/57 - 126/60)  RR: 18 (07-10-21 @ 06:04) (16 - 18)  SpO2: 98% (07-10-21 @ 06:04) (93% - 98%)    Physical Exam:  General: NAD  Chest: nonlabored breathing  Abdomen: Soft, non-tender, gravid  Ext: No pain or swelling    Labs:    MEDICATIONS  (STANDING):  heparin   Injectable 5000 Unit(s) SubCutaneous every 12 hours  oxytocin Infusion 333.333 milliUNIT(s)/Min (1000 mL/Hr) IV Continuous <Continuous>  prenatal multivitamin 1 Tablet(s) Oral daily    MEDICATIONS  (PRN):

## 2021-07-11 NOTE — PROGRESS NOTE ADULT - SUBJECTIVE AND OBJECTIVE BOX
R3 Antepartum Note, HD#11    Gestational AGE: 33+5    Interval events: Patient seen and examined at bedside, no acute overnight events. No acute complaints.   Pt reports +FM, denies LOF, VB, ctx, HA, epigastric pain, blurred vision, CP, SOB, N/V, fevers, and chills.    Vital Signs Last 24 Hours  T(C): 36.6 (07-11-21 @ 01:35), Max: 37.1 (07-10-21 @ 17:58)  HR: 81 (07-11-21 @ 01:35) (81 - 104)  BP: 100/52 (07-11-21 @ 01:35) (92/50 - 124/58)  RR: 17 (07-11-21 @ 01:35) (16 - 18)  SpO2: 96% (07-11-21 @ 01:35) (93% - 98%)    CAPILLARY BLOOD GLUCOSE    Physical Exam:  General: NAD  Abdomen: Soft, non-tender, gravid  Ext: No pain or swelling    NST reactive overnight    Labs:                MEDICATIONS  (STANDING):  heparin   Injectable 5000 Unit(s) SubCutaneous every 12 hours  oxytocin Infusion 333.333 milliUNIT(s)/Min (1000 mL/Hr) IV Continuous <Continuous>  prenatal multivitamin 1 Tablet(s) Oral daily    MEDICATIONS  (PRN):

## 2021-07-11 NOTE — PROGRESS NOTE ADULT - ASSESSMENT
42yo  at 33w4d a/w PPROM at 32w2d. Patient is currently stable without signs of labor or infection.    #PPROM  -s/p latency antibiotics: Amox (7/3-), s/p Azithro (), Amp (-3)  -s/p BMZ for fetal lung maturity (-)  -continue to monitor fetal status and for signs of labor or infection    #Maternal well-being  -Reg diet, SLIV  -VTE ppx: HSQ, SCDs, OOB    #Fetal well being   -NST BID 2hr  -ATU sono twice weekly  -prenatal vitamin  -ATU (): vertex, posterior, RODNEY 13.69, EFW 2241g (61%) w/ AC >95%  -NICU consult completed     #Delivery  -delivery at 34 w for rLTCS or earlier if fetal or maternal status deteriorates    Joanna Kathy R3   40yo  at 33w5d a/w PPROM at 32w2d. Patient is currently stable without signs of labor or infection.    #PPROM  -s/p latency antibiotics: Amox (7/3-), s/p Azithro (), Amp (-3)  -s/p BMZ for fetal lung maturity (-)  -continue to monitor fetal status and for signs of labor or infection    #Maternal well-being  -Reg diet, SLIV  -VTE ppx: HSQ, SCDs, OOB    #Fetal well being   -NST BID 2hr  -ATU sono twice weekly  -prenatal vitamin  -ATU (): vertex, posterior, RODNEY 13.69, EFW 2241g (61%) w/ AC >95%  -NICU consult completed     #Delivery  -delivery at 34 w for rLTCS or earlier if fetal or maternal status deteriorates    Scotty PGY3

## 2021-07-12 ENCOUNTER — TRANSCRIPTION ENCOUNTER (OUTPATIENT)
Age: 41
End: 2021-07-12

## 2021-07-12 RX ORDER — SODIUM CHLORIDE 9 MG/ML
1000 INJECTION, SOLUTION INTRAVENOUS
Refills: 0 | Status: DISCONTINUED | OUTPATIENT
Start: 2021-07-12 | End: 2021-07-13

## 2021-07-12 RX ORDER — CHLORHEXIDINE GLUCONATE 213 G/1000ML
1 SOLUTION TOPICAL AT BEDTIME
Refills: 0 | Status: DISCONTINUED | OUTPATIENT
Start: 2021-07-12 | End: 2021-07-13

## 2021-07-12 RX ADMIN — CHLORHEXIDINE GLUCONATE 1 APPLICATION(S): 213 SOLUTION TOPICAL at 22:08

## 2021-07-12 NOTE — PROGRESS NOTE ADULT - SUBJECTIVE AND OBJECTIVE BOX
R3 Antepartum Note, HD#12    Patient seen and examined at bedside, no acute overnight events. No acute complaints. Pt reports +FM, denies LOF, VB, ctx, HA, epigastric pain, blurred vision, CP, SOB, N/V, fevers, and chills.    Vital Signs Last 24 Hours  T(C): 36.6 (07-12-21 @ 05:32), Max: 36.9 (07-11-21 @ 18:11)  HR: 102 (07-12-21 @ 05:32) (87 - 108)  BP: 115/54 (07-12-21 @ 05:32) (99/58 - 125/60)  RR: 18 (07-12-21 @ 05:32) (16 - 18)  SpO2: 98% (07-12-21 @ 05:32) (98% - 99%)        Physical Exam:  General: NAD  CV: RRR, no m/r/g  Lungs: CTAB  Abdomen: Soft, non-tender, gravid  Ext: No pain or swelling    NST reactive overnight    MEDICATIONS  (STANDING):  heparin   Injectable 5000 Unit(s) SubCutaneous every 12 hours  oxytocin Infusion 333.333 milliUNIT(s)/Min (1000 mL/Hr) IV Continuous <Continuous>  prenatal multivitamin 1 Tablet(s) Oral daily

## 2021-07-12 NOTE — PROGRESS NOTE ADULT - ATTENDING COMMENTS
Patient seen at bedside. Agree with above note. Plan for RCS tomorrow AM.
no complaints today, continue plan as above
Patient seen at bedside. No complaints. Agree with above note. Plan for RCS at 34 weeks, time pending.
no complaints this am, no signs of intraamniotic infection, continue plan as above
Agree with above resident assessment and plan.  Patient for RCS w/ BTL (papers in chart) at 34 weeks or sooner PRN, patient understands.  Patient desires NICU consultation as  will require NICU stay, will arrange.  Reviewed recent lab work, fetal heart tracing with patient.   All questions answered, continue to monitor for signs of infection, fetal distress.

## 2021-07-12 NOTE — CHART NOTE - NSCHARTNOTEFT_GEN_A_CORE
BPP done: Cephalic presentation/ Posterior placenta/ RODNEY 7.1/ BPP 8/8.     Patient without complaints at this time. HR now 88. Denies fundal tendereness, palpatations, fever/chills.     f/u CBC at 6pm    Kenna ARGUETA
Ms. Jolly is a 40y/o  admitted at 32w2d gestational age for PPROM. Mom is receiving latency antibiotics and is s/p BMZ. Plan for delivery at 34 weeks.     I met with Ms. Jolly and discussed what to expect should she deliver at 34 weeks gestation. We discussed the followin. The NICU team will be present at her delivery and will immediately assess and care for her infant.    2. The infant may require respiratory support, most commonly in the form of nasal CPAP but may also not require any respiratory support. While unlikely, there is a small possibility that the infant would require intubation and mechanical ventilation.    3. Depending on the clinical status of the infant, enteral feedings may or may not be started immediately. The infant will receive IVF/IV nutrition as necessary. Due to immature suck/swallow, orogastric or nasogastric tube may be required once feeds are initiated. The infant is also at risk for hypoglycemia due to prematurity.    4. Discussed the benefits of breastfeeding in  infants and encouraged mother to pump following delivery.    5. The infant will be at risk for jaundice which can be treated with phototherapy.    6. The infant will be screened for infection and treated with antibiotics if deemed clinically necessary.    7. The infant is at risk for thermoregulation issues.      9. Length of stay is variable, but at this gestational age, discussed that the baby will be in the NICU for a minimum of 5 days. Reviewed discharge criteria: stable on RA, feeding well, maintaining temperature without external heat source.     Ms. Jolly had the opportunity to ask questions and may contact the NICU at any time if further questions arise.    Thank you for the opportunity to participate in the care of this patient and please inform us of any changes in her status.
Patient seen and evaluated s/p Bolus of LR. HR is now 114bpm. Patient denies any palpitations, denies fever, chills. Denies contractions, abdominal pain, N/V.     PE:  ICU Vital Signs Last 24 Hrs  T(C): 37.1 (2021 09:35), Max: 37.2 (2021 16:52)  T(F): 98.7 (2021 09:35), Max: 99 (2021 16:52)  HR: 114 (2021 12:39) (90 - 125)  BP: 116/60 (2021 09:35) (93/57 - 133/74)  BP(mean): --  ABP: --  ABP(mean): --  RR: 17 (2021 09:35) (16 - 17)  SpO2: 99% (2021 09:35) (92% - 100%)  A+Ox3 NAD  Abd: gravid soft no fundal tenderness  CV: sinus tach on EKG  EFM: 140 moderate variability + acels no decel  [place]: no contx                              11.4   17.50 )-----------( 236      ( 2021 10:26 )             35.0       Urinalysis Basic - ( 2021 20:37 )    Color: Light Yellow / Appearance: Clear / S.014 / pH: x  Gluc: x / Ketone: Negative  / Bili: Negative / Urobili: <2 mg/dL   Blood: x / Protein: Negative / Nitrite: Negative   Leuk Esterase: Negative / RBC: x / WBC x   Sq Epi: x / Non Sq Epi: x / Bacteria: x    Pt is aP1 at 32.3 weeks pregnant with PPROM yesterday; now with tachycardia  pt s/p bolus will continue LR at 125ml/hr  continue to closely monitor vitals every 4 hr  repeat CBC tonight at 6p  for ATU sonogram today  second dose of BMZ at 7p tonight      d/w Dr. Laura Plasencia [facility]
Patient with spotting overnight; no current vaginal bleeding noted on pad. Pt denies Contx, VB. Reports Clear LOF and +FM.  SSE performed at bedside.     PE:  ICU Vital Signs Last 24 Hrs  T(C): 37.1 (12 Jul 2021 09:35), Max: 37.1 (12 Jul 2021 09:35)  T(F): 98.7 (12 Jul 2021 09:35), Max: 98.7 (12 Jul 2021 09:35)  HR: 98 (12 Jul 2021 09:35) (87 - 108)  BP: 96/55 (12 Jul 2021 09:35) (96/55 - 115/54)  BP(mean): --  ABP: --  ABP(mean): --  RR: 17 (12 Jul 2021 09:35) (17 - 18)  SpO2: 98% (12 Jul 2021 09:35) (97% - 98%)  EFM: 140 moderate variability + acels no decels  [place]: None  SSE: no bleeding noted/ copious straw colored fluid/ cervix about 1-2 cm dilated visually     Plan:  - continue to monitor  - NST BID  - For C/S at 8am tomorrow    d/w Dr. Portillo Plasencia [facility]-bc
pt was evaluated at bedside. Denies vaginal bleeding abdominal pain + fetal movement. Patient was on NST when being evaluated . tracing looked reactive   maternal tachycardia, afebrile   a/p   32.3 weeks admitted for PPROM   continue latency antibiotics   celestone x 2   perform ekg, give 500cc bolus     will continue to monitor. If any signs of infection, chorioamnionitis or fetal distress, with induce/ expedite delivery

## 2021-07-12 NOTE — PROGRESS NOTE ADULT - ASSESSMENT
42yo  @ 33w6d a/w PPROM at 32w2d. Patient is currently stable without signs of labor or infection.    #PPROM  -s/p latency antibiotics: Amox (7/3-), s/p Azithro (), Amp (-3)  -s/p BMZ for fetal lung maturity (-)  -continue to monitor fetal status and for signs of labor or infection    #Maternal well-being  -Reg diet, SLIV  -VTE ppx: HSQ, SCDs, OOB    #Fetal well being   -NST BID 2hr  -ATU sono twice weekly  -prenatal vitamin  -ATU (): vertex, posterior, RODNEY 13.69, EFW 2241g (61%) w/ AC >95%  -NICU consult completed     #Delivery  -delivery at 34 w for rLTCS or earlier if fetal or maternal status deteriorates    [place] PGY-3

## 2021-07-13 ENCOUNTER — TRANSCRIPTION ENCOUNTER (OUTPATIENT)
Age: 41
End: 2021-07-13

## 2021-07-13 ENCOUNTER — RESULT REVIEW (OUTPATIENT)
Age: 41
End: 2021-07-13

## 2021-07-13 LAB
BASOPHILS # BLD AUTO: 0.06 K/UL — SIGNIFICANT CHANGE UP (ref 0–0.2)
BASOPHILS NFR BLD AUTO: 0.4 % — SIGNIFICANT CHANGE UP (ref 0–2)
BLD GP AB SCN SERPL QL: NEGATIVE — SIGNIFICANT CHANGE UP
EOSINOPHIL # BLD AUTO: 0.15 K/UL — SIGNIFICANT CHANGE UP (ref 0–0.5)
EOSINOPHIL NFR BLD AUTO: 1 % — SIGNIFICANT CHANGE UP (ref 0–6)
HCT VFR BLD CALC: 35.3 % — SIGNIFICANT CHANGE UP (ref 34.5–45)
HGB BLD-MCNC: 11.5 G/DL — SIGNIFICANT CHANGE UP (ref 11.5–15.5)
IANC: 11.27 K/UL — HIGH (ref 1.5–8.5)
IMM GRANULOCYTES NFR BLD AUTO: 2.4 % — HIGH (ref 0–1.5)
LYMPHOCYTES # BLD AUTO: 15.5 % — SIGNIFICANT CHANGE UP (ref 13–44)
LYMPHOCYTES # BLD AUTO: 2.38 K/UL — SIGNIFICANT CHANGE UP (ref 1–3.3)
MCHC RBC-ENTMCNC: 29.4 PG — SIGNIFICANT CHANGE UP (ref 27–34)
MCHC RBC-ENTMCNC: 32.6 GM/DL — SIGNIFICANT CHANGE UP (ref 32–36)
MCV RBC AUTO: 90.3 FL — SIGNIFICANT CHANGE UP (ref 80–100)
MONOCYTES # BLD AUTO: 1.14 K/UL — HIGH (ref 0–0.9)
MONOCYTES NFR BLD AUTO: 7.4 % — SIGNIFICANT CHANGE UP (ref 2–14)
NEUTROPHILS # BLD AUTO: 11.27 K/UL — HIGH (ref 1.8–7.4)
NEUTROPHILS NFR BLD AUTO: 73.3 % — SIGNIFICANT CHANGE UP (ref 43–77)
NRBC # BLD: 0 /100 WBCS — SIGNIFICANT CHANGE UP
NRBC # FLD: 0 K/UL — SIGNIFICANT CHANGE UP
PLATELET # BLD AUTO: 259 K/UL — SIGNIFICANT CHANGE UP (ref 150–400)
RBC # BLD: 3.91 M/UL — SIGNIFICANT CHANGE UP (ref 3.8–5.2)
RBC # FLD: 14.1 % — SIGNIFICANT CHANGE UP (ref 10.3–14.5)
RH IG SCN BLD-IMP: POSITIVE — SIGNIFICANT CHANGE UP
SARS-COV-2 RNA SPEC QL NAA+PROBE: SIGNIFICANT CHANGE UP
WBC # BLD: 15.37 K/UL — HIGH (ref 3.8–10.5)
WBC # FLD AUTO: 15.37 K/UL — HIGH (ref 3.8–10.5)

## 2021-07-13 PROCEDURE — 88307 TISSUE EXAM BY PATHOLOGIST: CPT | Mod: 26

## 2021-07-13 RX ORDER — IBUPROFEN 200 MG
600 TABLET ORAL EVERY 6 HOURS
Refills: 0 | Status: COMPLETED | OUTPATIENT
Start: 2021-07-13 | End: 2022-06-11

## 2021-07-13 RX ORDER — SODIUM CHLORIDE 9 MG/ML
1000 INJECTION, SOLUTION INTRAVENOUS ONCE
Refills: 0 | Status: COMPLETED | OUTPATIENT
Start: 2021-07-13 | End: 2021-07-13

## 2021-07-13 RX ORDER — BUPIVACAINE 13.3 MG/ML
20 INJECTION, SUSPENSION, LIPOSOMAL INFILTRATION ONCE
Refills: 0 | Status: COMPLETED | OUTPATIENT
Start: 2021-07-13 | End: 2021-07-13

## 2021-07-13 RX ORDER — SIMETHICONE 80 MG/1
80 TABLET, CHEWABLE ORAL EVERY 4 HOURS
Refills: 0 | Status: DISCONTINUED | OUTPATIENT
Start: 2021-07-13 | End: 2021-07-16

## 2021-07-13 RX ORDER — HEPARIN SODIUM 5000 [USP'U]/ML
5000 INJECTION INTRAVENOUS; SUBCUTANEOUS EVERY 12 HOURS
Refills: 0 | Status: DISCONTINUED | OUTPATIENT
Start: 2021-07-13 | End: 2021-07-16

## 2021-07-13 RX ORDER — MAGNESIUM HYDROXIDE 400 MG/1
30 TABLET, CHEWABLE ORAL
Refills: 0 | Status: DISCONTINUED | OUTPATIENT
Start: 2021-07-13 | End: 2021-07-16

## 2021-07-13 RX ORDER — OXYCODONE HYDROCHLORIDE 5 MG/1
5 TABLET ORAL ONCE
Refills: 0 | Status: DISCONTINUED | OUTPATIENT
Start: 2021-07-13 | End: 2021-07-16

## 2021-07-13 RX ORDER — TETANUS TOXOID, REDUCED DIPHTHERIA TOXOID AND ACELLULAR PERTUSSIS VACCINE, ADSORBED 5; 2.5; 8; 8; 2.5 [IU]/.5ML; [IU]/.5ML; UG/.5ML; UG/.5ML; UG/.5ML
0.5 SUSPENSION INTRAMUSCULAR ONCE
Refills: 0 | Status: COMPLETED | OUTPATIENT
Start: 2021-07-13

## 2021-07-13 RX ORDER — SODIUM CHLORIDE 9 MG/ML
1000 INJECTION, SOLUTION INTRAVENOUS
Refills: 0 | Status: DISCONTINUED | OUTPATIENT
Start: 2021-07-13 | End: 2021-07-15

## 2021-07-13 RX ORDER — LANOLIN
1 OINTMENT (GRAM) TOPICAL EVERY 6 HOURS
Refills: 0 | Status: DISCONTINUED | OUTPATIENT
Start: 2021-07-13 | End: 2021-07-16

## 2021-07-13 RX ORDER — KETOROLAC TROMETHAMINE 30 MG/ML
30 SYRINGE (ML) INJECTION EVERY 6 HOURS
Refills: 0 | Status: DISCONTINUED | OUTPATIENT
Start: 2021-07-13 | End: 2021-07-14

## 2021-07-13 RX ORDER — DIPHENHYDRAMINE HCL 50 MG
25 CAPSULE ORAL EVERY 6 HOURS
Refills: 0 | Status: DISCONTINUED | OUTPATIENT
Start: 2021-07-13 | End: 2021-07-16

## 2021-07-13 RX ORDER — BUPIVACAINE HCL/PF 7.5 MG/ML
20 VIAL (ML) INJECTION ONCE
Refills: 0 | Status: COMPLETED | OUTPATIENT
Start: 2021-07-13 | End: 2021-07-13

## 2021-07-13 RX ORDER — ACETAMINOPHEN 500 MG
975 TABLET ORAL
Refills: 0 | Status: DISCONTINUED | OUTPATIENT
Start: 2021-07-13 | End: 2021-07-16

## 2021-07-13 RX ORDER — OXYCODONE HYDROCHLORIDE 5 MG/1
5 TABLET ORAL
Refills: 0 | Status: DISCONTINUED | OUTPATIENT
Start: 2021-07-13 | End: 2021-07-16

## 2021-07-13 RX ORDER — OXYTOCIN 10 UNIT/ML
333.33 VIAL (ML) INJECTION
Qty: 20 | Refills: 0 | Status: DISCONTINUED | OUTPATIENT
Start: 2021-07-13 | End: 2021-07-14

## 2021-07-13 RX ORDER — CITRIC ACID/SODIUM CITRATE 300-500 MG
30 SOLUTION, ORAL ORAL ONCE
Refills: 0 | Status: COMPLETED | OUTPATIENT
Start: 2021-07-13 | End: 2021-07-13

## 2021-07-13 RX ORDER — SODIUM CHLORIDE 9 MG/ML
1000 INJECTION, SOLUTION INTRAVENOUS
Refills: 0 | Status: DISCONTINUED | OUTPATIENT
Start: 2021-07-13 | End: 2021-07-16

## 2021-07-13 RX ORDER — FAMOTIDINE 10 MG/ML
20 INJECTION INTRAVENOUS ONCE
Refills: 0 | Status: COMPLETED | OUTPATIENT
Start: 2021-07-13 | End: 2021-07-13

## 2021-07-13 RX ADMIN — FAMOTIDINE 20 MILLIGRAM(S): 10 INJECTION INTRAVENOUS at 08:19

## 2021-07-13 RX ADMIN — Medication 1000 MILLIUNIT(S)/MIN: at 14:22

## 2021-07-13 RX ADMIN — Medication 30 MILLILITER(S): at 08:19

## 2021-07-13 RX ADMIN — Medication 30 MILLIGRAM(S): at 21:40

## 2021-07-13 RX ADMIN — HEPARIN SODIUM 5000 UNIT(S): 5000 INJECTION INTRAVENOUS; SUBCUTANEOUS at 18:13

## 2021-07-13 RX ADMIN — Medication 30 MILLIGRAM(S): at 14:32

## 2021-07-13 RX ADMIN — Medication 20 MILLILITER(S): at 11:10

## 2021-07-13 RX ADMIN — SODIUM CHLORIDE 75 MILLILITER(S): 9 INJECTION, SOLUTION INTRAVENOUS at 14:22

## 2021-07-13 RX ADMIN — SODIUM CHLORIDE 1000 MILLILITER(S): 9 INJECTION, SOLUTION INTRAVENOUS at 07:18

## 2021-07-13 RX ADMIN — Medication 30 MILLIGRAM(S): at 20:55

## 2021-07-13 RX ADMIN — Medication 30 MILLIGRAM(S): at 15:20

## 2021-07-13 RX ADMIN — SODIUM CHLORIDE 125 MILLILITER(S): 9 INJECTION, SOLUTION INTRAVENOUS at 08:19

## 2021-07-13 RX ADMIN — BUPIVACAINE 20 MILLILITER(S): 13.3 INJECTION, SUSPENSION, LIPOSOMAL INFILTRATION at 11:10

## 2021-07-13 NOTE — DISCHARGE NOTE OB - CARE PROVIDER_API CALL
Tomas Norman)  Obstetrics and Gynecology  [address] [place] [address]  Phone: [phone]  Fax: [phone]  Follow Up Time:

## 2021-07-13 NOTE — OB RN DELIVERY SUMMARY - NSSELHIDDEN_OBGYN_ALL_OB_FT
[NS_DeliveryAttending1_OBGYN_ALL_OB_FT:[id number]],[NS_DeliveryRN_OBGYN_ALL_OB_FT:[email or web address]=]

## 2021-07-13 NOTE — DISCHARGE NOTE OB - MEDICATION SUMMARY - MEDICATIONS TO TAKE
I will START or STAY ON the medications listed below when I get home from the hospital:  None I will START or STAY ON the medications listed below when I get home from the hospital:    acetaminophen 325 mg oral tablet  -- 3 tab(s) by mouth   -- Indication: For Weeks of gestation of pregnancy not specified    ibuprofen 600 mg oral tablet  -- 1 tab(s) by mouth every 6 hours  -- Indication: For Weeks of gestation of pregnancy not specified    Prenatal Multivitamins with Folic Acid 1 mg oral tablet  -- 1 tab(s) by mouth once a day  -- Indication: For Weeks of gestation of pregnancy not specified

## 2021-07-13 NOTE — OB RN INTRAOPERATIVE NOTE - NSSELHIDDEN_OBGYN_ALL_OB_FT
[NS_DeliveryAttending1_OBGYN_ALL_OB_FT:[id number]],[NS_DeliveryRN_OBGYN_ALL_OB_FT:[email or web address]]

## 2021-07-13 NOTE — DISCHARGE NOTE OB - CARE PLAN
Principal Discharge DX:	Previous  section  Goal:	Recovery  Assessment and plan of treatment:	PO Care  Secondary Diagnosis:	Rupture of membranes with delay of delivery

## 2021-07-13 NOTE — DISCHARGE NOTE OB - PATIENT PORTAL LINK FT
You can access the FollowMyHealth Patient Portal offered by [facility] by registering at the following website: [email or web address]. By joining [email or web address]’s FollowMyHealth portal, you will also be able to view your health information using other applications (apps) compatible with our system.

## 2021-07-13 NOTE — OB RN DELIVERY SUMMARY - AS DELIV COMPLICATIONS OB
PPROM/malpresentation/other/premature rupture of membranes prior to labor PPROM/malpresentation/nuchal cord/other/premature rupture of membranes prior to labor

## 2021-07-13 NOTE — OB NEONATOLOGY/PEDIATRICIAN DELIVERY SUMMARY - NSPEDSNEONOTESA_OBGYN_ALL_OB_FT
Called to Delivery by OB for repeat C/S of 34 week  infant. This is a 34 and 0/7 week  male born to a 40 y/o , O+, prenatal labs neg, NR, and Immune with GBS neg ()) via repeat C/S. Maternal history of C/S  secondary to failure to progress and h/o uterine polyps. PPROM on  at 2000 with clear fluids ~ 14 days PTD. S/p Amp x 10 doses and Amoxil po x 5 days. S/p Beta x 2 ( and ). Infant in breech position at delivery with difficult extraction. Infant emerged with weak intermittent cry and poor color. W,D,S,S. W,D,S,S. Color and respiratory effort improved by 1 MOL. CPAP 5, 21% initiated at ~ 7 MOL secondary to increased work of breathing and retractions with some increased secretions noted. Oxygen saturations remained appropriate for MOL. Apgars 7,8. EOS N/A. Mother desires breast feeding and okay with bottle feeding. Desires circ and hep B. Transferred to NICU on CPAP 5, 21%. Temp prior to leaving delivery room 36.6 C by skin probe.

## 2021-07-14 LAB
BASOPHILS # BLD AUTO: 0.04 K/UL — SIGNIFICANT CHANGE UP (ref 0–0.2)
BASOPHILS NFR BLD AUTO: 0.3 % — SIGNIFICANT CHANGE UP (ref 0–2)
EOSINOPHIL # BLD AUTO: 0.11 K/UL — SIGNIFICANT CHANGE UP (ref 0–0.5)
EOSINOPHIL NFR BLD AUTO: 0.7 % — SIGNIFICANT CHANGE UP (ref 0–6)
HCT VFR BLD CALC: 30.2 % — LOW (ref 34.5–45)
HGB BLD-MCNC: 9.8 G/DL — LOW (ref 11.5–15.5)
IANC: 11.39 K/UL — HIGH (ref 1.5–8.5)
IMM GRANULOCYTES NFR BLD AUTO: 1.4 % — SIGNIFICANT CHANGE UP (ref 0–1.5)
LYMPHOCYTES # BLD AUTO: 14.6 % — SIGNIFICANT CHANGE UP (ref 13–44)
LYMPHOCYTES # BLD AUTO: 2.27 K/UL — SIGNIFICANT CHANGE UP (ref 1–3.3)
MCHC RBC-ENTMCNC: 29.2 PG — SIGNIFICANT CHANGE UP (ref 27–34)
MCHC RBC-ENTMCNC: 32.5 GM/DL — SIGNIFICANT CHANGE UP (ref 32–36)
MCV RBC AUTO: 89.9 FL — SIGNIFICANT CHANGE UP (ref 80–100)
MONOCYTES # BLD AUTO: 1.56 K/UL — HIGH (ref 0–0.9)
MONOCYTES NFR BLD AUTO: 10 % — SIGNIFICANT CHANGE UP (ref 2–14)
NEUTROPHILS # BLD AUTO: 11.39 K/UL — HIGH (ref 1.8–7.4)
NEUTROPHILS NFR BLD AUTO: 73 % — SIGNIFICANT CHANGE UP (ref 43–77)
NRBC # BLD: 0 /100 WBCS — SIGNIFICANT CHANGE UP
NRBC # FLD: 0 K/UL — SIGNIFICANT CHANGE UP
PLATELET # BLD AUTO: 217 K/UL — SIGNIFICANT CHANGE UP (ref 150–400)
RBC # BLD: 3.36 M/UL — LOW (ref 3.8–5.2)
RBC # FLD: 13.7 % — SIGNIFICANT CHANGE UP (ref 10.3–14.5)
WBC # BLD: 15.59 K/UL — HIGH (ref 3.8–10.5)
WBC # FLD AUTO: 15.59 K/UL — HIGH (ref 3.8–10.5)

## 2021-07-14 RX ORDER — IBUPROFEN 200 MG
600 TABLET ORAL EVERY 6 HOURS
Refills: 0 | Status: DISCONTINUED | OUTPATIENT
Start: 2021-07-14 | End: 2021-07-16

## 2021-07-14 RX ADMIN — Medication 975 MILLIGRAM(S): at 13:15

## 2021-07-14 RX ADMIN — Medication 975 MILLIGRAM(S): at 14:00

## 2021-07-14 RX ADMIN — Medication 30 MILLIGRAM(S): at 06:06

## 2021-07-14 RX ADMIN — Medication 975 MILLIGRAM(S): at 01:08

## 2021-07-14 RX ADMIN — HEPARIN SODIUM 5000 UNIT(S): 5000 INJECTION INTRAVENOUS; SUBCUTANEOUS at 06:05

## 2021-07-14 RX ADMIN — Medication 975 MILLIGRAM(S): at 02:00

## 2021-07-14 RX ADMIN — HEPARIN SODIUM 5000 UNIT(S): 5000 INJECTION INTRAVENOUS; SUBCUTANEOUS at 18:15

## 2021-07-14 NOTE — PROGRESS NOTE ADULT - SUBJECTIVE AND OBJECTIVE BOX
Patient seen and examined at bedside. Patient POD #1 s/p R'C/S and BTL complicated by  delivery after PPROM. Patient reports feeling well and her pain is controlled with pain medication. She reports passage of flatus, is tolerating a diet, and is ambulating without difficulty.     Physical exam:    Vital Signs Last 24 Hrs  T(C): 36.7 (2021 05:32), Max: 37.2 (2021 15:00)  T(F): 98 (2021 05:32), Max: 99 (2021 15:00)  HR: 88 (2021 05:32) (88 - 112)  BP: 95/54 (2021 05:32) (95/54 - 132/84)  BP(mean): 73 (2021 18:00) (69 - 99)  RR: 18 (2021 05:32) (13 - 19)  SpO2: 97% (2021 05:32) (94% - 100%)    Gen: NAD  Breast: Soft, nontender, not engorged.  Abdomen: Soft, nontender, no distension , firm uterine fundus at umbilicus.  Incision: C/D/I.  Pelvic: Normal lochia noted  Ext: No calf tenderness    LABS:                        9.8    15.59 )-----------( 217      ( 2021 06:42 )             30.2       Rubella status:     Allergies    No Known Allergies    Intolerances      MEDICATIONS  (STANDING):  acetaminophen   Tablet .. 975 milliGRAM(s) Oral <User Schedule>  diphtheria/tetanus/pertussis (acellular) Vaccine (ADAcel) 0.5 milliLiter(s) IntraMuscular once  heparin   Injectable 5000 Unit(s) SubCutaneous every 12 hours  ibuprofen  Tablet. 600 milliGRAM(s) Oral every 6 hours  ketorolac   Injectable 30 milliGRAM(s) IV Push every 6 hours  lactated ringers. 1000 milliLiter(s) (125 mL/Hr) IV Continuous <Continuous>  lactated ringers. 1000 milliLiter(s) (75 mL/Hr) IV Continuous <Continuous>  oxytocin Infusion 333.333 milliUNIT(s)/Min (1000 mL/Hr) IV Continuous <Continuous>  oxytocin Infusion 333.333 milliUNIT(s)/Min (1000 mL/Hr) IV Continuous <Continuous>  prenatal multivitamin 1 Tablet(s) Oral daily    MEDICATIONS  (PRN):  diphenhydrAMINE 25 milliGRAM(s) Oral every 6 hours PRN Pruritus  lanolin Ointment 1 Application(s) Topical every 6 hours PRN Sore Nipples  magnesium hydroxide Suspension 30 milliLiter(s) Oral two times a day PRN Constipation  oxyCODONE    IR 5 milliGRAM(s) Oral every 3 hours PRN Moderate to Severe Pain (4-10)  oxyCODONE    IR 5 milliGRAM(s) Oral once PRN Moderate to Severe Pain (4-10)  simethicone 80 milliGRAM(s) Chew every 4 hours PRN Gas        Assessment and Plan  POD # 1 s/p C/S.  Doing well.  Encourage ambulation.  Incisional care and PO instructions reviewed.  CPC.

## 2021-07-14 NOTE — PROGRESS NOTE ADULT - SUBJECTIVE AND OBJECTIVE BOX
Postop Day  1  s/p   C- Section    THERAPY:  [x  ] Spinal /epidural  morphine  was given in OR    Subjective: no major complaints    Pain scale score: at rest _3_, with activity _6_    Sedation Score:	  [x  ] Alert	    [  ] Drowsy        [  ] Arousable	[  ] Asleep	[  ] Unresponsive    Side Effects:	  [ x ] None	     [  ] Nausea        [  ] Pruritus        [  ] Weakness   [  ] Numbness      Objective: ambulates,  back non-tender, gross neuro exam normal    T(C): 36.7 (07-14-21 @ 05:32), Max: 37.2 (07-13-21 @ 15:00)  HR: 88 (07-14-21 @ 05:32) (88 - 112)  BP: 95/54 (07-14-21 @ 05:32) (95/54 - 132/84)  RR: 18 (07-14-21 @ 05:32) (13 - 19)  SpO2: 97% (07-14-21 @ 05:32) (94% - 100%)  Wt(kg): --    ASSESSMENT/ PLAN   [ x ] Continue PRN analgesics  [ x ]Documentation and Verification of current medications     acetaminophen   Tablet .. 975 milliGRAM(s) Oral <User Schedule>  diphenhydrAMINE 25 milliGRAM(s) Oral every 6 hours PRN  diphtheria/tetanus/pertussis (acellular) Vaccine (ADAcel) 0.5 milliLiter(s) IntraMuscular once  heparin   Injectable 5000 Unit(s) SubCutaneous every 12 hours  ibuprofen  Tablet. 600 milliGRAM(s) Oral every 6 hours  ketorolac   Injectable 30 milliGRAM(s) IV Push every 6 hours  lactated ringers. 1000 milliLiter(s) IV Continuous <Continuous>  lactated ringers. 1000 milliLiter(s) IV Continuous <Continuous>  lanolin Ointment 1 Application(s) Topical every 6 hours PRN  magnesium hydroxide Suspension 30 milliLiter(s) Oral two times a day PRN  oxyCODONE    IR 5 milliGRAM(s) Oral every 3 hours PRN  oxyCODONE    IR 5 milliGRAM(s) Oral once PRN  oxytocin Infusion 333.333 milliUNIT(s)/Min IV Continuous <Continuous>  oxytocin Infusion 333.333 milliUNIT(s)/Min IV Continuous <Continuous>  prenatal multivitamin 1 Tablet(s) Oral daily  simethicone 80 milliGRAM(s) Chew every 4 hours PRN      Comments: No anesthesia related complications noticed

## 2021-07-15 RX ORDER — ACETAMINOPHEN 500 MG
3 TABLET ORAL
Qty: 0 | Refills: 0 | DISCHARGE
Start: 2021-07-15

## 2021-07-15 RX ORDER — IBUPROFEN 200 MG
1 TABLET ORAL
Qty: 0 | Refills: 0 | DISCHARGE
Start: 2021-07-15

## 2021-07-15 RX ADMIN — Medication 975 MILLIGRAM(S): at 20:53

## 2021-07-15 RX ADMIN — Medication 600 MILLIGRAM(S): at 06:16

## 2021-07-15 RX ADMIN — Medication 600 MILLIGRAM(S): at 15:12

## 2021-07-15 RX ADMIN — Medication 975 MILLIGRAM(S): at 19:53

## 2021-07-15 RX ADMIN — HEPARIN SODIUM 5000 UNIT(S): 5000 INJECTION INTRAVENOUS; SUBCUTANEOUS at 06:16

## 2021-07-15 RX ADMIN — HEPARIN SODIUM 5000 UNIT(S): 5000 INJECTION INTRAVENOUS; SUBCUTANEOUS at 17:32

## 2021-07-15 RX ADMIN — Medication 975 MILLIGRAM(S): at 08:43

## 2021-07-15 RX ADMIN — Medication 600 MILLIGRAM(S): at 17:12

## 2021-07-15 NOTE — PROGRESS NOTE ADULT - NSICDXPILOT_GEN_ALL_CORE
[place]

## 2021-07-15 NOTE — PROGRESS NOTE ADULT - SUBJECTIVE AND OBJECTIVE BOX
Post-Operative Note, C/S  She is a  41y woman who is now post-operative day: 2    Subjective:  The patient feels well.  She is ambulating.   She is tolerating regular diet.  She denies nausea and vomiting; denies fever.  She is voiding.  Her pain is controlled; incisional pain is appropriate.  She reports normal postpartum bleeding.      Physical exam:    Vital Signs Last 24 Hrs  T(C): 36.6 (15 Jul 2021 05:24), Max: 37.1 (14 Jul 2021 14:01)  T(F): 97.9 (15 Jul 2021 05:24), Max: 98.8 (14 Jul 2021 14:01)  HR: 86 (15 Jul 2021 05:24) (83 - 96)  BP: 105/69 (15 Jul 2021 05:24) (105/69 - 113/71)  BP(mean): --  RR: 18 (15 Jul 2021 05:24) (18 - 18)  SpO2: 100% (15 Jul 2021 05:24) (98% - 100%)    Gen: NAD  Breast: Soft, nontender, not engorged.  Abdomen: Soft, nontender, no distension , firm uterine fundus at umbilicus.  Incision: C/D/I.  Pelvic: Normal lochia noted  Ext: No calf tenderness    LABS:                        9.8    15.59 )-----------( 217      ( 14 Jul 2021 06:42 )             30.2       Rubella status:     Allergies    No Known Allergies    Intolerances      MEDICATIONS  (STANDING):  acetaminophen   Tablet .. 975 milliGRAM(s) Oral <User Schedule>  diphtheria/tetanus/pertussis (acellular) Vaccine (ADAcel) 0.5 milliLiter(s) IntraMuscular once  heparin   Injectable 5000 Unit(s) SubCutaneous every 12 hours  ibuprofen  Tablet. 600 milliGRAM(s) Oral every 6 hours  lactated ringers. 1000 milliLiter(s) (125 mL/Hr) IV Continuous <Continuous>  lactated ringers. 1000 milliLiter(s) (75 mL/Hr) IV Continuous <Continuous>  prenatal multivitamin 1 Tablet(s) Oral daily    MEDICATIONS  (PRN):  diphenhydrAMINE 25 milliGRAM(s) Oral every 6 hours PRN Pruritus  lanolin Ointment 1 Application(s) Topical every 6 hours PRN Sore Nipples  magnesium hydroxide Suspension 30 milliLiter(s) Oral two times a day PRN Constipation  oxyCODONE    IR 5 milliGRAM(s) Oral every 3 hours PRN Moderate to Severe Pain (4-10)  oxyCODONE    IR 5 milliGRAM(s) Oral once PRN Moderate to Severe Pain (4-10)  simethicone 80 milliGRAM(s) Chew every 4 hours PRN Gas        Assessment and Plan  POD # 2 s/p C/S.  Doing well.  Encourage ambulation.  Incisional care and PO instructions reviewed.  [facility].  AK home tomorrow

## 2021-07-16 VITALS
SYSTOLIC BLOOD PRESSURE: 115 MMHG | TEMPERATURE: 98 F | RESPIRATION RATE: 17 BRPM | HEART RATE: 78 BPM | OXYGEN SATURATION: 100 % | DIASTOLIC BLOOD PRESSURE: 72 MMHG

## 2021-07-16 RX ORDER — TETANUS TOXOID, REDUCED DIPHTHERIA TOXOID AND ACELLULAR PERTUSSIS VACCINE, ADSORBED 5; 2.5; 8; 8; 2.5 [IU]/.5ML; [IU]/.5ML; UG/.5ML; UG/.5ML; UG/.5ML
0.5 SUSPENSION INTRAMUSCULAR ONCE
Refills: 0 | Status: COMPLETED | OUTPATIENT
Start: 2021-07-16 | End: 2021-07-16

## 2021-07-16 RX ADMIN — TETANUS TOXOID, REDUCED DIPHTHERIA TOXOID AND ACELLULAR PERTUSSIS VACCINE, ADSORBED 0.5 MILLILITER(S): 5; 2.5; 8; 8; 2.5 SUSPENSION INTRAMUSCULAR at 12:18

## 2021-07-16 RX ADMIN — Medication 600 MILLIGRAM(S): at 12:20

## 2021-07-16 RX ADMIN — Medication 600 MILLIGRAM(S): at 13:01

## 2021-07-16 RX ADMIN — Medication 600 MILLIGRAM(S): at 07:00

## 2021-07-16 RX ADMIN — Medication 600 MILLIGRAM(S): at 06:11

## 2021-07-16 RX ADMIN — Medication 600 MILLIGRAM(S): at 01:36

## 2021-07-16 RX ADMIN — Medication 600 MILLIGRAM(S): at 00:36

## 2021-07-16 RX ADMIN — HEPARIN SODIUM 5000 UNIT(S): 5000 INJECTION INTRAVENOUS; SUBCUTANEOUS at 06:12

## 2021-08-05 NOTE — ED ADULT TRIAGE NOTE - HEART RATE (BEATS/MIN)
Results discussed with patient via phone , advised to increase daily water intake and stay well hydrated, low wbc- will monitor and recheck CBC in 1-2 months, no anemia .  Urine- no UTI .  Normal thyroid and liver function, normal cholesterol.  Diabetes screening test result is normal.   Your Vitamin D level is slightly  low .   A prescription is sent to your pharmacy for high dose Vitamin D to bring level to normal . In order to maintain a normal level  you need  to take  vitamin D3 supplement of 2000 IU daily available over the counter.  Please  eat more fish and low fat dairy products.  Advised to follow up with PCP as planned- verbalized understanding.   110

## 2021-09-15 LAB — SURGICAL PATHOLOGY STUDY: SIGNIFICANT CHANGE UP

## 2021-11-01 NOTE — ED ADULT NURSE NOTE - NSSISCREENINGQ3_ED_A_ED
Spoke with Milo and he states he has been taking his Crestor, he states sometimes he forgets.  Please advise   No

## 2022-02-11 ENCOUNTER — APPOINTMENT (OUTPATIENT)
Dept: GASTROENTEROLOGY | Facility: CLINIC | Age: 42
End: 2022-02-11
Payer: COMMERCIAL

## 2022-02-11 VITALS
DIASTOLIC BLOOD PRESSURE: 90 MMHG | HEIGHT: 60 IN | BODY MASS INDEX: 33.38 KG/M2 | WEIGHT: 170 LBS | SYSTOLIC BLOOD PRESSURE: 130 MMHG

## 2022-02-11 DIAGNOSIS — Z78.9 OTHER SPECIFIED HEALTH STATUS: ICD-10-CM

## 2022-02-11 DIAGNOSIS — K63.5 POLYP OF COLON: ICD-10-CM

## 2022-02-11 PROCEDURE — 99204 OFFICE O/P NEW MOD 45 MIN: CPT

## 2022-02-11 NOTE — ASSESSMENT
[FreeTextEntry1] : Rule out peptic ulcer disease, gastritis versus irritable bowel syndrome\par \par VINICIO ARDON was advised to undergo endoscopy to which she agreed. The procedure will be performed in [facility] with the assistance of an anesthesiologist. She was given a booklet distributed by the [employer] Society [employer] explaining the procedure in detail and she understood the risks of the procedure not limited to infection, bleeding, perforation or non- diagnosis of gastric or esophageal cancer.  She was advised that she could not drive home, if she chooses to receive sedation. Further diagnostic and treatment recommendations will be based upon the procedure and any biopsies, if they are taken. Thank you for allowing me to participate in this patients health care.\par \par \par Repeat colonoscopy in August 2023

## 2022-02-11 NOTE — PHYSICAL EXAM
[General Appearance - Alert] : alert [General Appearance - In No Acute Distress] : in no acute distress [Sclera] : the sclera and conjunctiva were normal [PERRL With Normal Accommodation] : pupils were equal in size, round, and reactive to light [Extraocular Movements] : extraocular movements were intact [Outer Ear] : the ears and nose were normal in appearance [Oropharynx] : the oropharynx was normal [Neck Appearance] : the appearance of the neck was normal [Neck Cervical Mass (___cm)] : no neck mass was observed [Jugular Venous Distention Increased] : there was no jugular-venous distention [Thyroid Diffuse Enlargement] : the thyroid was not enlarged [Thyroid Nodule] : there were no palpable thyroid nodules [Auscultation Breath Sounds / Voice Sounds] : lungs were clear to auscultation bilaterally [FreeTextEntry1] : Midepigastric tenderness no masses no rebound no hepatosplenomegaly [No CVA Tenderness] : no ~M costovertebral angle tenderness [No Spinal Tenderness] : no spinal tenderness [Abnormal Walk] : normal gait [Nail Clubbing] : no clubbing  or cyanosis of the fingernails [Musculoskeletal - Swelling] : no joint swelling seen [Motor Tone] : muscle strength and tone were normal [Skin Color & Pigmentation] : normal skin color and pigmentation [Skin Turgor] : normal skin turgor [] : no rash [Deep Tendon Reflexes (DTR)] : deep tendon reflexes were 2+ and symmetric [Sensation] : the sensory exam was normal to light touch and pinprick [No Focal Deficits] : no focal deficits [Oriented To Time, Place, And Person] : oriented to person, place, and time [Impaired Insight] : insight and judgment were intact [Affect] : the affect was normal

## 2022-02-11 NOTE — CONSULT LETTER
[Dear  ___] : Dear  [unfilled], [Consult Letter:] : I had the pleasure of evaluating your patient, [unfilled]. [( Thank you for referring [unfilled] for consultation for _____ )] : Thank you for referring [unfilled] for consultation for [unfilled] [Please see my note below.] : Please see my note below. [Consult Closing:] : Thank you very much for allowing me to participate in the care of this patient.  If you have any questions, please do not hesitate to contact me. [Sincerely,] : Sincerely, [FreeTextEntry3] : Don\par \par Will Duran MD\par \par Gastroenterology\par [facility]\par [facility]\par \par

## 2022-02-11 NOTE — HISTORY OF PRESENT ILLNESS
[FreeTextEntry1] : She is a 41-year-old female recent onset of intermittent burning midepigastric abdominal pain.  She denies heartburn or chest pain.  She denies NSAID use.  She denies weight loss.  She had an endoscopy October 1, 2015 which was normal.  She also had a colonoscopy 8/20/2018 which revealed 1 polyp.  She admits to increased stress in her life as she is an  and this is the tax season.  Stress seems to precipitate her symptoms

## 2022-03-21 ENCOUNTER — RESULT REVIEW (OUTPATIENT)
Age: 42
End: 2022-03-21

## 2022-03-21 ENCOUNTER — APPOINTMENT (OUTPATIENT)
Dept: GASTROENTEROLOGY | Facility: AMBULATORY SURGERY CENTER | Age: 42
End: 2022-03-21
Payer: COMMERCIAL

## 2022-03-21 PROCEDURE — 43239 EGD BIOPSY SINGLE/MULTIPLE: CPT

## 2022-03-21 RX ORDER — HYOSCYAMINE SULFATE 0.38 MG/1
0.38 TABLET, EXTENDED RELEASE ORAL
Qty: 60 | Refills: 3 | Status: ACTIVE | COMMUNITY
Start: 2022-03-21 | End: 1900-01-01

## 2022-08-21 NOTE — OB RN PATIENT PROFILE - THE METHOD OF FEEDING WHEN THE NEWBORN REQUESTS AND CONTINUING EACH FEEDING SESSION UNTIL THE NEWBORN IS SATISFIED
Bed: 13  Expected date:   Expected time:   Means of arrival: Personal Transportation  Comments:  
Statement Selected

## 2022-08-22 NOTE — OB RN DELIVERY SUMMARY - [email or web address]_[email or web address]
Pt was seen by MD hager who left it up to patient if she wanted to have her sodium checked in two months. Pt declined but daughter wanted a lab available in case pt had any altered mental status. Writer agreed to place standing sodium order for patient.   
CST [place]

## 2022-11-04 ENCOUNTER — APPOINTMENT (OUTPATIENT)
Dept: UROLOGY | Facility: CLINIC | Age: 42
End: 2022-11-04

## 2022-11-04 VITALS — SYSTOLIC BLOOD PRESSURE: 123 MMHG | HEIGHT: 60 IN | DIASTOLIC BLOOD PRESSURE: 82 MMHG | HEART RATE: 81 BPM

## 2022-11-04 LAB
BILIRUB UR QL STRIP: NEGATIVE
CLARITY UR: CLEAR
COLLECTION METHOD: NORMAL
GLUCOSE UR-MCNC: NEGATIVE
HCG UR QL: 0.2 EU/DL
HGB UR QL STRIP.AUTO: NORMAL
KETONES UR-MCNC: NEGATIVE
LEUKOCYTE ESTERASE UR QL STRIP: NEGATIVE
NITRITE UR QL STRIP: NEGATIVE
PH UR STRIP: 6
PROT UR STRIP-MCNC: NORMAL
SP GR UR STRIP: 1.03

## 2022-11-04 PROCEDURE — 99204 OFFICE O/P NEW MOD 45 MIN: CPT

## 2022-11-04 PROCEDURE — 81003 URINALYSIS AUTO W/O SCOPE: CPT | Mod: QW

## 2022-11-04 RX ORDER — METHENAMINE HIPPURATE 1 G/1
1 TABLET ORAL
Qty: 60 | Refills: 5 | Status: ACTIVE | COMMUNITY
Start: 2022-11-04 | End: 1900-01-01

## 2022-11-04 NOTE — ASSESSMENT
[FreeTextEntry1] : 42F with recurrent UTIs. Unclear if culture proven or not\par --UA, UCx given sx. Udip low suspicion\par --Azo for symptom management now\par --Cont encourage fluid intake and frequent voiding. Goal of at least 1.5L of water or more per day\par --Anatomic eval with renal US and cysto\par --Begin ppx with methenamine and vitamin C\par --RTC for cysto

## 2022-11-04 NOTE — HISTORY OF PRESENT ILLNESS
[FreeTextEntry1] : 42F hx obesity and GERD presenting for recurrent urinary tract infections. She began having UTIs around late elementary school. She reports dysuria throughout her life but became more aware of it in the past few years. During her pregnancy this last year, she has had about 3-4 infections. Her PCP and her OBGYN have obtained urine cultures for these and she believes they have all been positive. She currently feels like she has infection. Urine dip today with low suspicion only positive for blood. \par \par Symptoms during UTI: increased frequency and dysuria as well as possible vaginal discharge. No hematuria or fevers. Her symptoms entirely resolve after abx. Has never tried AZO before. At baseline, no real urinary bother. \par Reports occasional stress incontinence with coughing.  \par \par Hygiene: Shower daily. Wipes front to back. No soaps in her genital area. Drinks about 3 glasses of water a day, 2 selzer reyes occasionally, 1 cup of coffee in the AM, 1x camomile (caffeine free). Never smoker. Alcohol socially. BM 1 every day, previously was constipated, soft and formed stools. Gynecologic Hx: Regular periods, 2 Pregnancies, 2 c-sections. Per chart review, she has a hx of apnea and daytime fatigue and will be evaluated for a sleep study. \par \par Family Hx:\par Mother: kidney stones, uterine cancer \par \par \par

## 2022-11-04 NOTE — PHYSICAL EXAM
[General Appearance - Well Developed] : well developed [Normal Appearance] : normal appearance [Heart Rate And Rhythm] : Heart rate and rhythm were normal [] : no respiratory distress [Abdomen Soft] : soft [Abdomen Tenderness] : non-tender [Costovertebral Angle Tenderness] : no ~M costovertebral angle tenderness [Normal Station and Gait] : the gait and station were normal for the patient's age [No Focal Deficits] : no focal deficits [Oriented To Time, Place, And Person] : oriented to person, place, and time

## 2022-11-07 LAB
APPEARANCE: CLEAR
BACTERIA UR CULT: NORMAL
BACTERIA: NEGATIVE
BILIRUBIN URINE: NEGATIVE
BLOOD URINE: ABNORMAL
COLOR: NORMAL
GLUCOSE QUALITATIVE U: NEGATIVE
HYALINE CASTS: 3 /LPF
KETONES URINE: NEGATIVE
LEUKOCYTE ESTERASE URINE: NEGATIVE
MICROSCOPIC-UA: NORMAL
NITRITE URINE: NEGATIVE
PH URINE: 6
PROTEIN URINE: NORMAL
RED BLOOD CELLS URINE: 3 /HPF
SPECIFIC GRAVITY URINE: 1.03
SQUAMOUS EPITHELIAL CELLS: 5 /HPF
UROBILINOGEN URINE: NORMAL
WHITE BLOOD CELLS URINE: 3 /HPF

## 2022-12-16 ENCOUNTER — OUTPATIENT (OUTPATIENT)
Dept: OUTPATIENT SERVICES | Facility: HOSPITAL | Age: 42
LOS: 1 days | End: 2022-12-16
Payer: COMMERCIAL

## 2022-12-16 ENCOUNTER — APPOINTMENT (OUTPATIENT)
Dept: UROLOGY | Facility: CLINIC | Age: 42
End: 2022-12-16

## 2022-12-16 ENCOUNTER — APPOINTMENT (OUTPATIENT)
Dept: ULTRASOUND IMAGING | Facility: IMAGING CENTER | Age: 42
End: 2022-12-16

## 2022-12-16 DIAGNOSIS — Z98.891 HISTORY OF UTERINE SCAR FROM PREVIOUS SURGERY: Chronic | ICD-10-CM

## 2022-12-16 DIAGNOSIS — N39.0 URINARY TRACT INFECTION, SITE NOT SPECIFIED: ICD-10-CM

## 2022-12-16 DIAGNOSIS — N20.0 CALCULUS OF KIDNEY: ICD-10-CM

## 2022-12-16 DIAGNOSIS — R35.0 FREQUENCY OF MICTURITION: ICD-10-CM

## 2022-12-16 PROCEDURE — 52000 CYSTOURETHROSCOPY: CPT

## 2022-12-16 PROCEDURE — 76770 US EXAM ABDO BACK WALL COMP: CPT | Mod: 26

## 2022-12-16 PROCEDURE — 76770 US EXAM ABDO BACK WALL COMP: CPT

## 2022-12-20 PROBLEM — N20.0 KIDNEY STONE: Status: ACTIVE | Noted: 2022-12-20

## 2022-12-20 PROBLEM — N39.0 RECURRENT UTI: Status: ACTIVE | Noted: 2022-11-04

## 2022-12-23 DIAGNOSIS — N39.0 URINARY TRACT INFECTION, SITE NOT SPECIFIED: ICD-10-CM

## 2023-06-16 ENCOUNTER — APPOINTMENT (OUTPATIENT)
Dept: UROLOGY | Facility: CLINIC | Age: 43
End: 2023-06-16

## 2023-10-23 ENCOUNTER — APPOINTMENT (OUTPATIENT)
Dept: GASTROENTEROLOGY | Facility: CLINIC | Age: 43
End: 2023-10-23
Payer: COMMERCIAL

## 2023-10-23 VITALS
OXYGEN SATURATION: 99 % | HEIGHT: 60 IN | BODY MASS INDEX: 35.53 KG/M2 | WEIGHT: 181 LBS | DIASTOLIC BLOOD PRESSURE: 80 MMHG | HEART RATE: 83 BPM | TEMPERATURE: 98 F | SYSTOLIC BLOOD PRESSURE: 130 MMHG | RESPIRATION RATE: 14 BRPM

## 2023-10-23 PROCEDURE — 99214 OFFICE O/P EST MOD 30 MIN: CPT

## 2023-10-23 RX ORDER — SODIUM SULFATE, POTASSIUM SULFATE AND MAGNESIUM SULFATE 1.6; 3.13; 17.5 G/177ML; G/177ML; G/177ML
17.5-3.13-1.6 SOLUTION ORAL TWICE DAILY
Qty: 2 | Refills: 0 | Status: ACTIVE | COMMUNITY
Start: 2023-10-23 | End: 1900-01-01

## 2024-01-08 ENCOUNTER — APPOINTMENT (OUTPATIENT)
Dept: GASTROENTEROLOGY | Facility: AMBULATORY SURGERY CENTER | Age: 44
End: 2024-01-08
Payer: COMMERCIAL

## 2024-01-08 PROCEDURE — 45378 DIAGNOSTIC COLONOSCOPY: CPT

## 2024-02-01 NOTE — OB RN PATIENT PROFILE - PARENTS VERBALIZED UNDERSTANDING OF THE SAFE SKIN TO SKIN POSITIONING OF THE NEWBORN.
Include Z78.9 (Other Specified Conditions Influencing Health Status) As An Associated Diagnosis?: No Total Number Of Lesions Treated: 14 Post-Care Instructions: I reviewed with the patient in detail post-care instructions. Patient is to wear sunprotection, and avoid picking at any of the treated lesions. Pt may apply Vaseline to crusted or scabbing areas. Medical Necessity Information: It is in your best interest to select a reason for this procedure from the list below. All of these items fulfill various CMS LCD requirements except the new and changing color options. Medical Necessity Clause: This procedure was medically necessary because the lesions that were treated were: irritated. Detail Level: Zone Consent: The patient's consent was obtained including but not limited to risks of crusting, scabbing, blistering, scarring, darker or lighter pigmentary change, recurrence, incomplete removal and infection. Statement Selected

## 2024-02-09 ENCOUNTER — APPOINTMENT (OUTPATIENT)
Dept: GASTROENTEROLOGY | Facility: CLINIC | Age: 44
End: 2024-02-09
Payer: COMMERCIAL

## 2024-02-09 VITALS
BODY MASS INDEX: 35.34 KG/M2 | HEIGHT: 60 IN | OXYGEN SATURATION: 98 % | SYSTOLIC BLOOD PRESSURE: 120 MMHG | WEIGHT: 180 LBS | RESPIRATION RATE: 14 BRPM | TEMPERATURE: 97 F | HEART RATE: 102 BPM | DIASTOLIC BLOOD PRESSURE: 78 MMHG

## 2024-02-09 DIAGNOSIS — R10.13 EPIGASTRIC PAIN: ICD-10-CM

## 2024-02-09 DIAGNOSIS — K21.9 GASTRO-ESOPHAGEAL REFLUX DISEASE W/OUT ESOPHAGITIS: ICD-10-CM

## 2024-02-09 DIAGNOSIS — Z86.010 PERSONAL HISTORY OF COLONIC POLYPS: ICD-10-CM

## 2024-02-09 DIAGNOSIS — Z12.11 ENCOUNTER FOR SCREENING FOR MALIGNANT NEOPLASM OF COLON: ICD-10-CM

## 2024-02-09 PROCEDURE — 99214 OFFICE O/P EST MOD 30 MIN: CPT

## 2024-02-09 RX ORDER — PANTOPRAZOLE 40 MG/1
40 TABLET, DELAYED RELEASE ORAL
Qty: 30 | Refills: 4 | Status: ACTIVE | COMMUNITY
Start: 2024-02-09 | End: 1900-01-01

## 2024-02-09 RX ORDER — DICYCLOMINE HYDROCHLORIDE 20 MG/1
20 TABLET ORAL
Qty: 90 | Refills: 2 | Status: ACTIVE | COMMUNITY
Start: 2024-02-09 | End: 1900-01-01

## 2024-02-09 NOTE — CONSULT LETTER
[Dear  ___] : Dear  [unfilled], [Consult Letter:] : I had the pleasure of evaluating your patient, [unfilled]. [( Thank you for referring [unfilled] for consultation for _____ )] : Thank you for referring [unfilled] for consultation for [unfilled] [Please see my note below.] : Please see my note below. [Consult Closing:] : Thank you very much for allowing me to participate in the care of this patient.  If you have any questions, please do not hesitate to contact me. [Sincerely,] : Sincerely, [FreeTextEntry3] : Pawan Duran MD  Gastroenterology [facility]

## 2024-02-09 NOTE — ASSESSMENT
[FreeTextEntry1] : Most likely this represents recurrent irritable bowel syndrome with reflux  I placed her on pantoprazole 40 mg daily 1 hour before breakfast and dicyclomine 20 mg 3 times a day  1/2 hour before meals  Office follow up in 2 months   Repeat colonoscopy in 5 years  I spent 31 minutes with the patient answered all her questions

## 2025-02-13 NOTE — OB RN TRIAGE NOTE - TEMPERATURE IN FAHRENHEIT (DEGREES F)
Detail Level: Detailed Isotretinoin Pregnancy And Lactation Text: This medication is Pregnancy Category X and is considered extremely dangerous during pregnancy. It is unknown if it is excreted in breast milk. Topical Sulfur Applications Counseling: Topical Sulfur Counseling: Patient counseled that this medication may cause skin irritation or allergic reactions.  In the event of skin irritation, the patient was advised to reduce the amount of the drug applied or use it less frequently.   The patient verbalized understanding of the proper use and possible adverse effects of topical sulfur application.  All of the patient's questions and concerns were addressed. Benzoyl Peroxide Pregnancy And Lactation Text: This medication is Pregnancy Category C. It is unknown if benzoyl peroxide is excreted in breast milk. Spironolactone Counseling: Patient advised regarding risks of diarrhea, abdominal pain, hyperkalemia, birth defects (for female patients), liver toxicity and renal toxicity. The patient may need blood work to monitor liver and kidney function and potassium levels while on therapy. The patient verbalized understanding of the proper use and possible adverse effects of spironolactone.  All of the patient's questions and concerns were addressed. Azelaic Acid Pregnancy And Lactation Text: This medication is considered safe during pregnancy and breast feeding. Birth Control Pills Counseling: Birth Control Pill Counseling: I discussed with the patient the potential side effects of OCPs including but not limited to increased risk of stroke, heart attack, thrombophlebitis, deep venous thrombosis, hepatic adenomas, breast changes, GI upset, headaches, and depression.  The patient verbalized understanding of the proper use and possible adverse effects of OCPs. All of the patient's questions and concerns were addressed. Erythromycin Pregnancy And Lactation Text: This medication is Pregnancy Category B and is considered safe during pregnancy. It is also excreted in breast milk. Sarecycline Counseling: Patient advised regarding possible photosensitivity and discoloration of the teeth, skin, lips, tongue and gums.  Patient instructed to avoid sunlight, if possible.  When exposed to sunlight, patients should wear protective clothing, sunglasses, and sunscreen.  The patient was instructed to call the office immediately if the following severe adverse effects occur:  hearing changes, easy bruising/bleeding, severe headache, or vision changes.  The patient verbalized understanding of the proper use and possible adverse effects of sarecycline.  All of the patient's questions and concerns were addressed. Bactrim Counseling:  I discussed with the patient the risks of sulfa antibiotics including but not limited to GI upset, allergic reaction, drug rash, diarrhea, dizziness, photosensitivity, and yeast infections.  Rarely, more serious reactions can occur including but not limited to aplastic anemia, agranulocytosis, methemoglobinemia, blood dyscrasias, liver or kidney failure, lung infiltrates or desquamative/blistering drug rashes. Topical Clindamycin Counseling: Patient counseled that this medication may cause skin irritation or allergic reactions.  In the event of skin irritation, the patient was advised to reduce the amount of the drug applied or use it less frequently.   The patient verbalized understanding of the proper use and possible adverse effects of clindamycin.  All of the patient's questions and concerns were addressed. Doxycycline Pregnancy And Lactation Text: This medication is Pregnancy Category D and not consider safe during pregnancy. It is also excreted in breast milk but is considered safe for shorter treatment courses. Tazorac Counseling:  Patient advised that medication is irritating and drying.  Patient may need to apply sparingly and wash off after an hour before eventually leaving it on overnight.  The patient verbalized understanding of the proper use and possible adverse effects of tazorac.  All of the patient's questions and concerns were addressed. Aklief Pregnancy And Lactation Text: It is unknown if this medication is safe to use during pregnancy.  It is unknown if this medication is excreted in breast milk.  Breastfeeding women should use the topical cream on the smallest area of the skin for the shortest time needed while breastfeeding.  Do not apply to nipple and areola. Use Enhanced Medication Counseling?: No Winlevi Pregnancy And Lactation Text: This medication is considered safe during pregnancy and breastfeeding. Minocycline Counseling: Patient advised regarding possible photosensitivity and discoloration of the teeth, skin, lips, tongue and gums.  Patient instructed to avoid sunlight, if possible.  When exposed to sunlight, patients should wear protective clothing, sunglasses, and sunscreen.  The patient was instructed to call the office immediately if the following severe adverse effects occur:  hearing changes, easy bruising/bleeding, severe headache, or vision changes.  The patient verbalized understanding of the proper use and possible adverse effects of minocycline.  All of the patient's questions and concerns were addressed. Azithromycin Counseling:  I discussed with the patient the risks of azithromycin including but not limited to GI upset, allergic reaction, drug rash, diarrhea, and yeast infections. Tetracycline Pregnancy And Lactation Text: This medication is Pregnancy Category D and not consider safe during pregnancy. It is also excreted in breast milk. Dapsone Pregnancy And Lactation Text: This medication is Pregnancy Category C and is not considered safe during pregnancy or breast feeding. Topical Retinoid counseling:  Patient advised to apply a pea-sized amount only at bedtime and wait 30 minutes after washing their face before applying.  If too drying, patient may add a non-comedogenic moisturizer. The patient verbalized understanding of the proper use and possible adverse effects of retinoids.  All of the patient's questions and concerns were addressed. Topical Sulfur Applications Pregnancy And Lactation Text: This medication is Pregnancy Category C and has an unknown safety profile during pregnancy. It is unknown if this topical medication is excreted in breast milk. High Dose Vitamin A Counseling: Side effects reviewed, pt to contact office should one occur. Benzoyl Peroxide Counseling: Patient counseled that medicine may cause skin irritation and bleach clothing.  In the event of skin irritation, the patient was advised to reduce the amount of the drug applied or use it less frequently.   The patient verbalized understanding of the proper use and possible adverse effects of benzoyl peroxide.  All of the patient's questions and concerns were addressed. Spironolactone Pregnancy And Lactation Text: This medication can cause feminization of the male fetus and should be avoided during pregnancy. The active metabolite is also found in breast milk. Birth Control Pills Pregnancy And Lactation Text: This medication should be avoided if pregnant and for the first 30 days post-partum. Topical Clindamycin Pregnancy And Lactation Text: This medication is Pregnancy Category B and is considered safe during pregnancy. It is unknown if it is excreted in breast milk. Isotretinoin Counseling: Patient should get monthly blood tests, not donate blood, not drive at night if vision affected, not share medication, and not undergo elective surgery for 6 months after tx completed. Side effects reviewed, pt to contact office should one occur. Azelaic Acid Counseling: Patient counseled that medicine may cause skin irritation and to avoid applying near the eyes.  In the event of skin irritation, the patient was advised to reduce the amount of the drug applied or use it less frequently.   The patient verbalized understanding of the proper use and possible adverse effects of azelaic acid.  All of the patient's questions and concerns were addressed. Erythromycin Counseling:  I discussed with the patient the risks of erythromycin including but not limited to GI upset, allergic reaction, drug rash, diarrhea, increase in liver enzymes, and yeast infections. Bactrim Pregnancy And Lactation Text: This medication is Pregnancy Category D and is known to cause fetal risk.  It is also excreted in breast milk. Tazorac Pregnancy And Lactation Text: This medication is not safe during pregnancy. It is unknown if this medication is excreted in breast milk. Azithromycin Pregnancy And Lactation Text: This medication is considered safe during pregnancy and is also secreted in breast milk. Aklief counseling:  Patient advised to apply a pea-sized amount only at bedtime and wait 30 minutes after washing their face before applying.  If too drying, patient may add a non-comedogenic moisturizer.  The most commonly reported side effects including irritation, redness, scaling, dryness, stinging, burning, itching, and increased risk of sunburn.  The patient verbalized understanding of the proper use and possible adverse effects of retinoids.  All of the patient's questions and concerns were addressed. Topical Retinoid Pregnancy And Lactation Text: This medication is Pregnancy Category C. It is unknown if this medication is excreted in breast milk. Doxycycline Counseling:  Patient counseled regarding possible photosensitivity and increased risk for sunburn.  Patient instructed to avoid sunlight, if possible.  When exposed to sunlight, patients should wear protective clothing, sunglasses, and sunscreen.  The patient was instructed to call the office immediately if the following severe adverse effects occur:  hearing changes, easy bruising/bleeding, severe headache, or vision changes.  The patient verbalized understanding of the proper use and possible adverse effects of doxycycline.  All of the patient's questions and concerns were addressed. Tetracycline Counseling: Patient counseled regarding possible photosensitivity and increased risk for sunburn.  Patient instructed to avoid sunlight, if possible.  When exposed to sunlight, patients should wear protective clothing, sunglasses, and sunscreen.  The patient was instructed to call the office immediately if the following severe adverse effects occur:  hearing changes, easy bruising/bleeding, severe headache, or vision changes.  The patient verbalized understanding of the proper use and possible adverse effects of tetracycline.  All of the patient's questions and concerns were addressed. Patient understands to avoid pregnancy while on therapy due to potential birth defects. High Dose Vitamin A Pregnancy And Lactation Text: High dose vitamin A therapy is contraindicated during pregnancy and breast feeding. Dapsone Counseling: I discussed with the patient the risks of dapsone including but not limited to hemolytic anemia, agranulocytosis, rashes, methemoglobinemia, kidney failure, peripheral neuropathy, headaches, GI upset, and liver toxicity.  Patients who start dapsone require monitoring including baseline LFTs and weekly CBCs for the first month, then every month thereafter.  The patient verbalized understanding of the proper use and possible adverse effects of dapsone.  All of the patient's questions and concerns were addressed. Winlevi Counseling:  I discussed with the patient the risks of topical clascoterone including but not limited to erythema, scaling, itching, and stinging. Patient voiced their understanding. 99